# Patient Record
Sex: FEMALE | Race: WHITE | Employment: FULL TIME | URBAN - METROPOLITAN AREA
[De-identification: names, ages, dates, MRNs, and addresses within clinical notes are randomized per-mention and may not be internally consistent; named-entity substitution may affect disease eponyms.]

---

## 2017-07-31 ENCOUNTER — OFFICE VISIT (OUTPATIENT)
Dept: INTERNAL MEDICINE CLINIC | Facility: CLINIC | Age: 57
End: 2017-07-31

## 2017-07-31 VITALS
HEIGHT: 67 IN | BODY MASS INDEX: 38.61 KG/M2 | WEIGHT: 246 LBS | HEART RATE: 70 BPM | TEMPERATURE: 98 F | SYSTOLIC BLOOD PRESSURE: 135 MMHG | DIASTOLIC BLOOD PRESSURE: 85 MMHG

## 2017-07-31 DIAGNOSIS — E66.9 OBESITY (BMI 35.0-39.9 WITHOUT COMORBIDITY): ICD-10-CM

## 2017-07-31 DIAGNOSIS — Z12.39 SCREENING FOR BREAST CANCER: ICD-10-CM

## 2017-07-31 DIAGNOSIS — I10 HTN (HYPERTENSION), BENIGN: ICD-10-CM

## 2017-07-31 DIAGNOSIS — Z00.00 ENCOUNTER FOR MEDICAL EXAMINATION TO ESTABLISH CARE: Primary | ICD-10-CM

## 2017-07-31 DIAGNOSIS — Z12.11 SCREENING FOR COLON CANCER: ICD-10-CM

## 2017-07-31 DIAGNOSIS — D22.39 MELANOCYTIC NEVUS OF FACE, OTHER LOCATION: ICD-10-CM

## 2017-07-31 PROBLEM — D22.9 BENIGN MOLE: Status: ACTIVE | Noted: 2017-07-31

## 2017-07-31 LAB
ALBUMIN SERPL BCP-MCNC: 4.2 G/DL (ref 3.5–4.8)
ALBUMIN/GLOB SERPL: 1.2 {RATIO} (ref 1–2)
ALP SERPL-CCNC: 89 U/L (ref 32–100)
ALT SERPL-CCNC: 33 U/L (ref 14–54)
ANION GAP SERPL CALC-SCNC: 9 MMOL/L (ref 0–18)
AST SERPL-CCNC: 25 U/L (ref 15–41)
BILIRUB SERPL-MCNC: 0.8 MG/DL (ref 0.3–1.2)
BUN SERPL-MCNC: 16 MG/DL (ref 8–20)
BUN/CREAT SERPL: 15.8 (ref 10–20)
CALCIUM SERPL-MCNC: 9.4 MG/DL (ref 8.5–10.5)
CHLORIDE SERPL-SCNC: 103 MMOL/L (ref 95–110)
CHOLEST SERPL-MCNC: 263 MG/DL (ref 110–200)
CO2 SERPL-SCNC: 26 MMOL/L (ref 22–32)
CREAT SERPL-MCNC: 1.01 MG/DL (ref 0.5–1.5)
GLOBULIN PLAS-MCNC: 3.5 G/DL (ref 2.5–3.7)
GLUCOSE SERPL-MCNC: 96 MG/DL (ref 70–99)
HDLC SERPL-MCNC: 46 MG/DL
LDLC SERPL CALC-MCNC: 176 MG/DL (ref 0–99)
NONHDLC SERPL-MCNC: 217 MG/DL
OSMOLALITY UR CALC.SUM OF ELEC: 287 MOSM/KG (ref 275–295)
POTASSIUM SERPL-SCNC: 3.4 MMOL/L (ref 3.3–5.1)
PROT SERPL-MCNC: 7.7 G/DL (ref 5.9–8.4)
SODIUM SERPL-SCNC: 138 MMOL/L (ref 136–144)
TRIGL SERPL-MCNC: 206 MG/DL (ref 1–149)
TSH SERPL-ACNC: 2.43 UIU/ML (ref 0.45–5.33)

## 2017-07-31 PROCEDURE — 36415 COLL VENOUS BLD VENIPUNCTURE: CPT | Performed by: INTERNAL MEDICINE

## 2017-07-31 PROCEDURE — 99204 OFFICE O/P NEW MOD 45 MIN: CPT | Performed by: INTERNAL MEDICINE

## 2017-07-31 PROCEDURE — 99212 OFFICE O/P EST SF 10 MIN: CPT | Performed by: INTERNAL MEDICINE

## 2017-07-31 RX ORDER — LISINOPRIL AND HYDROCHLOROTHIAZIDE 25; 20 MG/1; MG/1
1 TABLET ORAL DAILY
Qty: 90 TABLET | Refills: 2 | Status: SHIPPED | OUTPATIENT
Start: 2017-07-31 | End: 2018-05-08

## 2017-07-31 RX ORDER — METOPROLOL TARTRATE 50 MG/1
50 TABLET, FILM COATED ORAL 2 TIMES DAILY
Qty: 180 TABLET | Refills: 0 | Status: SHIPPED | OUTPATIENT
Start: 2017-07-31 | End: 2017-07-31

## 2017-07-31 RX ORDER — METOPROLOL TARTRATE 50 MG/1
50 TABLET, FILM COATED ORAL 2 TIMES DAILY
Qty: 180 TABLET | Refills: 0 | Status: SHIPPED | OUTPATIENT
Start: 2017-07-31 | End: 2019-11-27

## 2017-07-31 RX ORDER — LISINOPRIL AND HYDROCHLOROTHIAZIDE 25; 20 MG/1; MG/1
1 TABLET ORAL DAILY
Qty: 30 TABLET | Refills: 2 | Status: SHIPPED | OUTPATIENT
Start: 2017-07-31 | End: 2020-10-28

## 2017-07-31 NOTE — PATIENT INSTRUCTIONS
Encounter for medical examination to establish care  (primary encounter diagnosis)  Htn (hypertension), benign- advised to watch her diet and aerobic exercise 4 times a week for 39  Min, check bp at home and bring log book next visit, continue with current · Read labels and choose foods with less added sugar. Keep in mind that dairy foods and foods with fruit will have some natural sugar. · Cut the sugar in recipes by 1/3 to 1/2. Boost the flavor with extracts like almond, vanilla, or orange.  Or add spices If you have high blood pressure, you should do what is listed below to lower your blood pressure. If you are taking medicines for high blood pressure, these methods may reduce or end your need for medicines in the future.   · Begin a weight-loss program if You will need to make regular visits to your health care provider. This is to check your blood pressure and to make changes to your medicines. Make a follow-up appointment as directed.   When to seek medical advice  Call your health care provider right away

## 2017-07-31 NOTE — PROGRESS NOTES
HPI:    Patient ID: Clark Martienz is a 56year old female. HPI She came in today to establish care with new physician.    She states that she does have htn - she doesn't check her bp , she is trying to watch her diet and taking her medication regularly daily. Disp: 30 tablet Rfl: 2   Lisinopril-Hydrochlorothiazide 20-25 MG Oral Tab Take 1 tablet by mouth daily. Disp: 90 tablet Rfl: 2   Metoprolol Tartrate 50 MG Oral Tab Take 1 tablet (50 mg total) by mouth 2 (two) times daily.  Disp: 180 tablet Rfl: 0 rhythm, normal heart sounds and intact distal pulses. Exam reveals no gallop and no friction rub. No murmur heard. Pulmonary/Chest: Effort normal and breath sounds normal. No accessory muscle usage. No respiratory distress. She has no wheezes.  She has Metoprolol Tartrate 50 MG Oral Tab 180 tablet 0      Sig: Take 1 tablet (50 mg total) by mouth 2 (two) times daily.            Imaging & Referrals:  GASTRO - INTERNAL  DERM - INTERNAL  MADHAVI SCREENING WILMAR (CPT=[id number])        #[id number]

## 2017-08-01 LAB — HBA1C MFR BLD: 5.8 % (ref 4–6)

## 2017-11-06 RX ORDER — METOPROLOL TARTRATE 50 MG/1
50 TABLET, FILM COATED ORAL 2 TIMES DAILY
Qty: 180 TABLET | Refills: 0 | Status: SHIPPED | OUTPATIENT
Start: 2017-11-06 | End: 2020-10-28

## 2017-11-07 RX ORDER — METOPROLOL TARTRATE 50 MG/1
50 TABLET, FILM COATED ORAL 2 TIMES DAILY
Qty: 180 TABLET | Refills: 0 | Status: SHIPPED | OUTPATIENT
Start: 2017-11-07 | End: 2017-11-09

## 2017-11-09 RX ORDER — METOPROLOL TARTRATE 50 MG/1
50 TABLET, FILM COATED ORAL 2 TIMES DAILY
Qty: 180 TABLET | Refills: 0 | Status: SHIPPED | OUTPATIENT
Start: 2017-11-09 | End: 2018-11-08

## 2018-05-08 RX ORDER — LISINOPRIL AND HYDROCHLOROTHIAZIDE 25; 20 MG/1; MG/1
1 TABLET ORAL DAILY
Qty: 90 TABLET | Refills: 2 | Status: SHIPPED | OUTPATIENT
Start: 2018-05-08 | End: 2019-02-08

## 2018-11-08 RX ORDER — METOPROLOL TARTRATE 50 MG/1
TABLET, FILM COATED ORAL
Qty: 180 TABLET | Refills: 1 | Status: SHIPPED | OUTPATIENT
Start: 2018-11-08 | End: 2019-05-10

## 2019-02-08 RX ORDER — LISINOPRIL AND HYDROCHLOROTHIAZIDE 25; 20 MG/1; MG/1
1 TABLET ORAL DAILY
Qty: 90 TABLET | Refills: 2 | Status: SHIPPED | OUTPATIENT
Start: 2019-02-08 | End: 2019-11-18

## 2019-05-10 RX ORDER — METOPROLOL TARTRATE 50 MG/1
TABLET, FILM COATED ORAL
Qty: 180 TABLET | Refills: 1 | Status: SHIPPED | OUTPATIENT
Start: 2019-05-10 | End: 2020-02-20

## 2019-05-10 NOTE — TELEPHONE ENCOUNTER
Hypertensive Medications  Protocol Criteria:  · Appointment scheduled in the past 6 months or in the next 3 months  · BMP or CMP in the past 12 months  · Creatinine result < 2  Recent Outpatient Visits            1 year ago Encounter for medical examinatio

## 2019-11-16 NOTE — TELEPHONE ENCOUNTER
My chart message sent to schedule appt     CSS please assist patient in scheduling a follow up appointment - thank you         Please review; protocol failed.     Requested Prescriptions     Pending Prescriptions Disp Refills   • LISINOPRIL-HYDROCHLOROTHIAZ

## 2019-11-18 RX ORDER — LISINOPRIL AND HYDROCHLOROTHIAZIDE 25; 20 MG/1; MG/1
1 TABLET ORAL DAILY
Qty: 90 TABLET | Refills: 0 | OUTPATIENT
Start: 2019-11-18

## 2019-11-27 ENCOUNTER — OFFICE VISIT (OUTPATIENT)
Dept: INTERNAL MEDICINE CLINIC | Facility: CLINIC | Age: 59
End: 2019-11-27
Payer: COMMERCIAL

## 2019-11-27 VITALS
TEMPERATURE: 98 F | HEART RATE: 78 BPM | HEIGHT: 67 IN | BODY MASS INDEX: 39.24 KG/M2 | DIASTOLIC BLOOD PRESSURE: 85 MMHG | RESPIRATION RATE: 18 BRPM | SYSTOLIC BLOOD PRESSURE: 138 MMHG | WEIGHT: 250 LBS

## 2019-11-27 DIAGNOSIS — N93.9 VAGINAL BLEEDING: ICD-10-CM

## 2019-11-27 DIAGNOSIS — Z12.4 SCREENING FOR CERVICAL CANCER: ICD-10-CM

## 2019-11-27 DIAGNOSIS — Z00.00 ANNUAL PHYSICAL EXAM: Primary | ICD-10-CM

## 2019-11-27 DIAGNOSIS — Z12.11 SCREENING FOR COLON CANCER: ICD-10-CM

## 2019-11-27 DIAGNOSIS — Z12.39 SCREENING FOR BREAST CANCER: ICD-10-CM

## 2019-11-27 PROCEDURE — 99396 PREV VISIT EST AGE 40-64: CPT | Performed by: INTERNAL MEDICINE

## 2019-11-27 RX ORDER — LISINOPRIL AND HYDROCHLOROTHIAZIDE 25; 20 MG/1; MG/1
1 TABLET ORAL DAILY
Qty: 90 TABLET | Refills: 2 | Status: SHIPPED | OUTPATIENT
Start: 2019-11-27 | End: 2020-08-26

## 2019-11-27 RX ORDER — METOPROLOL TARTRATE 50 MG/1
50 TABLET, FILM COATED ORAL 2 TIMES DAILY
Qty: 180 TABLET | Refills: 0 | Status: SHIPPED | OUTPATIENT
Start: 2019-11-27 | End: 2020-02-18

## 2019-11-27 NOTE — PROGRESS NOTES
HPI:   Kel Wellington is a 64year old female who presents for a complete physical exam  She is menopausal.  But she states that the last week and since then she noticed some vaginal bleeding. It stopped.         mWt Readings from Last 3 Encounters:  11/27/ irregular heartbeat/palpitations. GI Negative Abdominal pain, blood in stool, constipation, diarrhea, heartburn, nausea and vomiting.  Negative Dysuria, hematuria, urinary incontinence. Menses regular, not heavy.    Endocrine Negative Cold intolerance Normal. No swelling or deformities. Skin Normal Inspection - Normal.   Neurological Normal Memory - Normal. Sensory - Normal. Motor - Normal. Balance & gait - Normal.   Psychiatric Normal Orientation - Oriented to time, place, person & situation.  Appropr

## 2019-11-27 NOTE — PATIENT INSTRUCTIONS
Health maintenance: patient is due for mammogram ordered  Screening for colon cancer referral for colonoscopy ,fit test  htn -controlled advised to continue with current medication check blood pressure at home and bring logbook next visit watch diet avoid

## 2020-02-18 RX ORDER — METOPROLOL TARTRATE 50 MG/1
50 TABLET, FILM COATED ORAL 2 TIMES DAILY
Qty: 180 TABLET | Refills: 0 | Status: SHIPPED | OUTPATIENT
Start: 2020-02-18 | End: 2020-10-28

## 2020-02-20 RX ORDER — METOPROLOL TARTRATE 50 MG/1
TABLET, FILM COATED ORAL
Qty: 180 TABLET | Refills: 1 | Status: SHIPPED | OUTPATIENT
Start: 2020-02-20 | End: 2020-08-21

## 2020-03-02 ENCOUNTER — HOSPITAL ENCOUNTER (OUTPATIENT)
Dept: ULTRASOUND IMAGING | Age: 60
Discharge: HOME | End: 2020-03-02
Attending: INTERNAL MEDICINE
Payer: COMMERCIAL

## 2020-03-02 ENCOUNTER — LAB ENCOUNTER (OUTPATIENT)
Dept: LAB | Age: 60
End: 2020-03-02
Attending: INTERNAL MEDICINE
Payer: COMMERCIAL

## 2020-03-02 DIAGNOSIS — Z00.00 ANNUAL PHYSICAL EXAM: ICD-10-CM

## 2020-03-02 DIAGNOSIS — N93.9 VAGINAL BLEEDING: ICD-10-CM

## 2020-03-02 LAB
ALBUMIN SERPL-MCNC: 3.7 G/DL (ref 3.4–5)
ALBUMIN/GLOB SERPL: 0.9 {RATIO} (ref 1–2)
ALP LIVER SERPL-CCNC: 98 U/L (ref 46–118)
ALT SERPL-CCNC: 35 U/L (ref 13–56)
ANION GAP SERPL CALC-SCNC: 3 MMOL/L (ref 0–18)
AST SERPL-CCNC: 24 U/L (ref 15–37)
BASOPHILS # BLD AUTO: 0.03 X10(3) UL (ref 0–0.2)
BASOPHILS NFR BLD AUTO: 0.5 %
BILIRUB SERPL-MCNC: 0.8 MG/DL (ref 0.1–2)
BUN BLD-MCNC: 19 MG/DL (ref 7–18)
BUN/CREAT SERPL: 19.6 (ref 10–20)
CALCIUM BLD-MCNC: 9.4 MG/DL (ref 8.5–10.1)
CHLORIDE SERPL-SCNC: 107 MMOL/L (ref 98–112)
CHOLEST SMN-MCNC: 238 MG/DL (ref ?–200)
CO2 SERPL-SCNC: 30 MMOL/L (ref 21–32)
CREAT BLD-MCNC: 0.97 MG/DL (ref 0.55–1.02)
DEPRECATED RDW RBC AUTO: 40.7 FL (ref 35.1–46.3)
EOSINOPHIL # BLD AUTO: 0.1 X10(3) UL (ref 0–0.7)
EOSINOPHIL NFR BLD AUTO: 1.6 %
ERYTHROCYTE [DISTWIDTH] IN BLOOD BY AUTOMATED COUNT: 12.6 % (ref 11–15)
EST. AVERAGE GLUCOSE BLD GHB EST-MCNC: 128 MG/DL (ref 68–126)
GLOBULIN PLAS-MCNC: 3.9 G/DL (ref 2.8–4.4)
GLUCOSE BLD-MCNC: 100 MG/DL (ref 70–99)
HBA1C MFR BLD HPLC: 6.1 % (ref ?–5.7)
HCT VFR BLD AUTO: 46.3 % (ref 35–48)
HDLC SERPL-MCNC: 47 MG/DL (ref 40–59)
HGB BLD-MCNC: 15.5 G/DL (ref 12–16)
IMM GRANULOCYTES # BLD AUTO: 0.01 X10(3) UL (ref 0–1)
IMM GRANULOCYTES NFR BLD: 0.2 %
LDLC SERPL CALC-MCNC: 162 MG/DL (ref ?–100)
LYMPHOCYTES # BLD AUTO: 1.88 X10(3) UL (ref 1–4)
LYMPHOCYTES NFR BLD AUTO: 30.1 %
M PROTEIN MFR SERPL ELPH: 7.6 G/DL (ref 6.4–8.2)
MCH RBC QN AUTO: 29.6 PG (ref 26–34)
MCHC RBC AUTO-ENTMCNC: 33.5 G/DL (ref 31–37)
MCV RBC AUTO: 88.5 FL (ref 80–100)
MONOCYTES # BLD AUTO: 0.41 X10(3) UL (ref 0.1–1)
MONOCYTES NFR BLD AUTO: 6.6 %
NEUTROPHILS # BLD AUTO: 3.81 X10 (3) UL (ref 1.5–7.7)
NEUTROPHILS # BLD AUTO: 3.81 X10(3) UL (ref 1.5–7.7)
NEUTROPHILS NFR BLD AUTO: 61 %
NONHDLC SERPL-MCNC: 191 MG/DL (ref ?–130)
OSMOLALITY SERPL CALC.SUM OF ELEC: 292 MOSM/KG (ref 275–295)
PATIENT FASTING Y/N/NP: YES
PATIENT FASTING Y/N/NP: YES
PLATELET # BLD AUTO: 305 10(3)UL (ref 150–450)
POTASSIUM SERPL-SCNC: 4.2 MMOL/L (ref 3.5–5.1)
RBC # BLD AUTO: 5.23 X10(6)UL (ref 3.8–5.3)
SODIUM SERPL-SCNC: 140 MMOL/L (ref 136–145)
T4 FREE SERPL-MCNC: 0.9 NG/DL (ref 0.8–1.7)
TRIGL SERPL-MCNC: 143 MG/DL (ref 30–149)
TSI SER-ACNC: 2.42 MIU/ML (ref 0.36–3.74)
VLDLC SERPL CALC-MCNC: 29 MG/DL (ref 0–30)
WBC # BLD AUTO: 6.2 X10(3) UL (ref 4–11)

## 2020-03-02 PROCEDURE — 83036 HEMOGLOBIN GLYCOSYLATED A1C: CPT

## 2020-03-02 PROCEDURE — 76830 TRANSVAGINAL US NON-OB: CPT | Performed by: INTERNAL MEDICINE

## 2020-03-02 PROCEDURE — 36415 COLL VENOUS BLD VENIPUNCTURE: CPT

## 2020-03-02 PROCEDURE — 84439 ASSAY OF FREE THYROXINE: CPT

## 2020-03-02 PROCEDURE — 84443 ASSAY THYROID STIM HORMONE: CPT

## 2020-03-02 PROCEDURE — 80053 COMPREHEN METABOLIC PANEL: CPT

## 2020-03-02 PROCEDURE — 80061 LIPID PANEL: CPT

## 2020-03-02 PROCEDURE — 76856 US EXAM PELVIC COMPLETE: CPT | Performed by: INTERNAL MEDICINE

## 2020-03-02 PROCEDURE — 85025 COMPLETE CBC W/AUTO DIFF WBC: CPT

## 2020-03-07 ENCOUNTER — TELEPHONE (OUTPATIENT)
Dept: INTERNAL MEDICINE CLINIC | Facility: CLINIC | Age: 60
End: 2020-03-07

## 2020-03-07 ENCOUNTER — TELEPHONE (OUTPATIENT)
Dept: SURGERY | Facility: CLINIC | Age: 60
End: 2020-03-07

## 2020-03-07 RX ORDER — ATORVASTATIN CALCIUM 10 MG/1
10 TABLET, FILM COATED ORAL NIGHTLY
Qty: 90 TABLET | Refills: 0 | Status: SHIPPED | OUTPATIENT
Start: 2020-03-07 | End: 2020-06-05

## 2020-03-07 NOTE — TELEPHONE ENCOUNTER
Per result encounter pt to start atorvastatin. Pt stated she need medication she to [facility] of [facility]. Script resent to requested pharmacy.

## 2020-03-07 NOTE — TELEPHONE ENCOUNTER
----- Message from Andrea Thacker MD sent at 3/2/2020  4:51 PM CST -----  Please let her know that her pelvic us shows that her endometrium is thickened I will refer to see gynecology because we will need to have further work-up.

## 2020-03-12 ENCOUNTER — HOSPITAL ENCOUNTER (OUTPATIENT)
Dept: MAMMOGRAPHY | Age: 60
Discharge: HOME | End: 2020-03-12
Attending: INTERNAL MEDICINE
Payer: COMMERCIAL

## 2020-03-12 DIAGNOSIS — Z12.39 SCREENING FOR BREAST CANCER: ICD-10-CM

## 2020-03-12 PROCEDURE — 77067 SCR MAMMO BI INCL CAD: CPT | Performed by: INTERNAL MEDICINE

## 2020-03-12 PROCEDURE — 77063 BREAST TOMOSYNTHESIS BI: CPT | Performed by: INTERNAL MEDICINE

## 2020-06-05 RX ORDER — ATORVASTATIN CALCIUM 10 MG/1
TABLET, FILM COATED ORAL
Qty: 90 TABLET | Refills: 0 | Status: SHIPPED | OUTPATIENT
Start: 2020-06-05 | End: 2020-09-16

## 2020-08-21 RX ORDER — METOPROLOL TARTRATE 50 MG/1
TABLET, FILM COATED ORAL
Qty: 180 TABLET | Refills: 1 | Status: SHIPPED | OUTPATIENT
Start: 2020-08-21 | End: 2021-02-25

## 2020-08-26 RX ORDER — LISINOPRIL AND HYDROCHLOROTHIAZIDE 25; 20 MG/1; MG/1
TABLET ORAL
Qty: 90 TABLET | Refills: 2 | Status: SHIPPED | OUTPATIENT
Start: 2020-08-26 | End: 2021-05-24

## 2020-09-16 RX ORDER — ATORVASTATIN CALCIUM 10 MG/1
10 TABLET, FILM COATED ORAL NIGHTLY
Qty: 90 TABLET | Refills: 0 | Status: SHIPPED | OUTPATIENT
Start: 2020-09-16 | End: 2020-12-19

## 2020-10-28 ENCOUNTER — OFFICE VISIT (OUTPATIENT)
Dept: INTERNAL MEDICINE CLINIC | Facility: CLINIC | Age: 60
End: 2020-10-28
Payer: COMMERCIAL

## 2020-10-28 VITALS
SYSTOLIC BLOOD PRESSURE: 134 MMHG | TEMPERATURE: 98 F | WEIGHT: 260.19 LBS | HEIGHT: 67 IN | BODY MASS INDEX: 40.84 KG/M2 | HEART RATE: 90 BPM | DIASTOLIC BLOOD PRESSURE: 83 MMHG

## 2020-10-28 DIAGNOSIS — Z23 NEED FOR VACCINATION: ICD-10-CM

## 2020-10-28 DIAGNOSIS — N95.0 POST-MENOPAUSAL BLEEDING: ICD-10-CM

## 2020-10-28 DIAGNOSIS — K62.5 BRBPR (BRIGHT RED BLOOD PER RECTUM): ICD-10-CM

## 2020-10-28 DIAGNOSIS — N85.00 ENDOMETRIAL HYPERPLASIA: Primary | ICD-10-CM

## 2020-10-28 PROCEDURE — 99214 OFFICE O/P EST MOD 30 MIN: CPT | Performed by: INTERNAL MEDICINE

## 2020-10-28 PROCEDURE — 3075F SYST BP GE 130 - 139MM HG: CPT | Performed by: INTERNAL MEDICINE

## 2020-10-28 PROCEDURE — 3079F DIAST BP 80-89 MM HG: CPT | Performed by: INTERNAL MEDICINE

## 2020-10-28 PROCEDURE — 3008F BODY MASS INDEX DOCD: CPT | Performed by: INTERNAL MEDICINE

## 2020-10-28 NOTE — PROGRESS NOTES
Talat Anne is a 64year old female.   Patient presents with:  Bleeding: vaginal bleeding had US 3/2/2020      HPI:   NEW PT   C/C urgent visit   C/o vaginal bleeding  X one yr -once and then reoccurred march and it was a little more and the nit stopped u frequently  MUS: No back pain, + joint pain- knees , no muscle pain  NEURO: denies headaches , anxiety, depression,+ stress     EXAM:   /83   Pulse 90   Temp 98.3 °F (36.8 °C) (Oral)   Ht 5' 7\" (1.702 m)   Wt 260 lb 3.2 oz (118 kg)   BMI 40.75 kg/m² go to the emergency room      Preventative medicine   Mammogram 3/2020 normal   Pap - 11/19 normal         The patient indicates understanding of these issues and agrees to the plan. No follow-ups on file.

## 2020-10-29 ENCOUNTER — LAB ENCOUNTER (OUTPATIENT)
Dept: LAB | Facility: HOSPITAL | Age: 60
End: 2020-10-29
Attending: INTERNAL MEDICINE
Payer: COMMERCIAL

## 2020-10-29 ENCOUNTER — OFFICE VISIT (OUTPATIENT)
Dept: OBGYN CLINIC | Facility: CLINIC | Age: 60
End: 2020-10-29
Payer: COMMERCIAL

## 2020-10-29 VITALS
WEIGHT: 259 LBS | BODY MASS INDEX: 41 KG/M2 | DIASTOLIC BLOOD PRESSURE: 84 MMHG | SYSTOLIC BLOOD PRESSURE: 150 MMHG | HEART RATE: 88 BPM

## 2020-10-29 DIAGNOSIS — N84.1 MUCOUS POLYP OF CERVIX: ICD-10-CM

## 2020-10-29 DIAGNOSIS — N95.0 POST-MENOPAUSAL BLEEDING: ICD-10-CM

## 2020-10-29 DIAGNOSIS — E78.00 HYPERCHOLESTEROLEMIA: ICD-10-CM

## 2020-10-29 DIAGNOSIS — N95.0 POSTMENOPAUSAL BLEEDING: Primary | ICD-10-CM

## 2020-10-29 DIAGNOSIS — R73.01 IFG (IMPAIRED FASTING GLUCOSE): ICD-10-CM

## 2020-10-29 DIAGNOSIS — K62.5 BRBPR (BRIGHT RED BLOOD PER RECTUM): ICD-10-CM

## 2020-10-29 PROCEDURE — 36415 COLL VENOUS BLD VENIPUNCTURE: CPT

## 2020-10-29 PROCEDURE — 3077F SYST BP >= 140 MM HG: CPT | Performed by: OBSTETRICS & GYNECOLOGY

## 2020-10-29 PROCEDURE — 85025 COMPLETE CBC W/AUTO DIFF WBC: CPT

## 2020-10-29 PROCEDURE — 58100 BIOPSY OF UTERUS LINING: CPT | Performed by: OBSTETRICS & GYNECOLOGY

## 2020-10-29 PROCEDURE — 3079F DIAST BP 80-89 MM HG: CPT | Performed by: OBSTETRICS & GYNECOLOGY

## 2020-10-29 PROCEDURE — 80053 COMPREHEN METABOLIC PANEL: CPT

## 2020-10-29 PROCEDURE — 99202 OFFICE O/P NEW SF 15 MIN: CPT | Performed by: OBSTETRICS & GYNECOLOGY

## 2020-10-29 PROCEDURE — 83036 HEMOGLOBIN GLYCOSYLATED A1C: CPT

## 2020-10-29 PROCEDURE — 80061 LIPID PANEL: CPT

## 2020-10-29 NOTE — PROCEDURES
Endometrial Biopsy    Pre-Procedure Care:   Consent was obtained. Procedure/risks were explained. Questions were answered. Correct patient was identified. Correct side and site were confirmed.     Pregnancy Results: negative from n/a test       Pre-Medi

## 2020-10-29 NOTE — PROCEDURES
Polypectomy     Birth control method(s) used: postmenopausal  Consent signed. Procedure discussed with the patient in detail including indication, risks, benefits, alternatives and complications.     Pelvic Exam Findings:  Cervix: polyp    Procedure:  Spec

## 2020-10-29 NOTE — PROGRESS NOTES
Sandra ROJAS 3/19/1960       Patient presents with:  Gyn Exam: annual....vaginal bleeding  pt has had some episodes of postmenopausal bleeding- she did not have it evaluated due to it went away at the time of covid.  She had vaginal bleeding with a tablet (10 mg total) by mouth nightly., Disp: 90 tablet, Rfl: 0    •  LISINOPRIL-HYDROCHLOROTHIAZIDE 20-25 MG Oral Tab, TAKE 1 TABLET BY MOUTH EVERY DAY, Disp: 90 tablet, Rfl: 2    •  Metoprolol Tartrate 50 MG Oral Tab, TAKE 1 TABLET BY MOUTH TWICE DAILY,

## 2020-11-19 ENCOUNTER — NURSE ONLY (OUTPATIENT)
Dept: GASTROENTEROLOGY | Facility: CLINIC | Age: 60
End: 2020-11-19

## 2020-11-19 NOTE — PROGRESS NOTES
Unable to do phone screening due to hx of blood in stool and irregular bowel habits. Denies symptoms at this time. Appointment made for 12/28/2020 at 10:30 am  with Rupert Medina. Patient advised to come 15 minutes early. Address given.   Patient voiced

## 2020-12-03 ENCOUNTER — TELEPHONE (OUTPATIENT)
Dept: INTERNAL MEDICINE CLINIC | Facility: CLINIC | Age: 60
End: 2020-12-03

## 2020-12-03 NOTE — TELEPHONE ENCOUNTER
Patient states that she tested positive for Covid last week at a government testing site. Still having low grade fever, nausea, diarrhea and weakness. Patient requested to see Dr. Karie Montero. Virtual visit made for tomorrow.    Future Appointments   D

## 2020-12-04 ENCOUNTER — TELEMEDICINE (OUTPATIENT)
Dept: INTERNAL MEDICINE CLINIC | Facility: CLINIC | Age: 60
End: 2020-12-04
Payer: COMMERCIAL

## 2020-12-04 ENCOUNTER — TELEPHONE (OUTPATIENT)
Dept: INTERNAL MEDICINE CLINIC | Facility: CLINIC | Age: 60
End: 2020-12-04

## 2020-12-04 DIAGNOSIS — U07.1 COVID-19: Primary | ICD-10-CM

## 2020-12-04 PROCEDURE — 99213 OFFICE O/P EST LOW 20 MIN: CPT | Performed by: INTERNAL MEDICINE

## 2020-12-04 RX ORDER — ALBUTEROL SULFATE 90 UG/1
2 AEROSOL, METERED RESPIRATORY (INHALATION) EVERY 6 HOURS PRN
Qty: 1 INHALER | Refills: 0 | Status: SHIPPED | OUTPATIENT
Start: 2020-12-04

## 2020-12-04 NOTE — TELEPHONE ENCOUNTER
Patient had a visit earlier today. She had initially refused an Albuterol Inhaler. Patient changed her mind and is asking if you could prescribe it.     Please advise

## 2020-12-04 NOTE — PROGRESS NOTES
Patient ID: Debra Brown is a 64year old female. Patient presents with:  Fever         HISTORY OF PRESENT ILLNESS:   Patient presents for above. This visit is conducted using Telemedicine with live, interactive video and audio.     C/c covid   c/o  Flu      • OTHER SURGICAL HISTORY      neck surg bn cyst in the  like          Current Outpatient Medications:   •  atorvastatin 10 MG Oral Tab, Take 1 tablet (10 mg total) by mouth nightly., Disp: 90 tablet, Rfl: 0  •  LISINOPRIL-HYDROCHLOROTH perform vitals or do physical exam as this is a virtual video visit. Patient appears alert. No conversational dyspnea or distress. ASSESSMENT/PLAN:   1.  COVID-19  Advised to wash hands frequently, cover coughs and sneezes wear a mask even at home soci agreed to telehealth consent form, related consents and the risks discussed. Lastly, the patient confirmed that [place] in [place]. Included in this visit, time may have been spent reviewing labs, medications, radiology tests and decision making.  A

## 2020-12-16 RX ORDER — ATORVASTATIN CALCIUM 10 MG/1
TABLET, FILM COATED ORAL
Qty: 90 TABLET | Refills: 0 | OUTPATIENT
Start: 2020-12-16

## 2020-12-16 NOTE — TELEPHONE ENCOUNTER
Patient is seeing another provider will call them for refill.    I will remove Dr Homero Dyer from patient chart

## 2020-12-19 RX ORDER — ATORVASTATIN CALCIUM 10 MG/1
10 TABLET, FILM COATED ORAL NIGHTLY
Qty: 90 TABLET | Refills: 1 | Status: SHIPPED | OUTPATIENT
Start: 2020-12-19 | End: 2021-06-03

## 2020-12-19 NOTE — TELEPHONE ENCOUNTER
Patient needs refill on Atorvastatin. It was last prescribed by Camryn Rosario, but she has since changed her PCP to [facility]. Please advise.  Refill pended for approval.

## 2021-02-19 RX ORDER — LANCETS 33 GAUGE
EACH MISCELLANEOUS
Qty: 100 EACH | Refills: 0 | Status: SHIPPED | OUTPATIENT
Start: 2021-02-19

## 2021-02-25 RX ORDER — METOPROLOL TARTRATE 50 MG/1
TABLET, FILM COATED ORAL
Qty: 180 TABLET | Refills: 1 | Status: SHIPPED | OUTPATIENT
Start: 2021-02-25 | End: 2021-08-16

## 2021-02-25 NOTE — TELEPHONE ENCOUNTER
SECOND REQUEST  Current Outpatient Medications:     •  Metoprolol Tartrate 50 MG Oral Tab, TAKE 1 TABLET BY MOUTH TWICE DAILY, Disp: 180 tablet, Rfl: 1

## 2021-03-18 DIAGNOSIS — Z23 NEED FOR VACCINATION: ICD-10-CM

## 2021-05-24 RX ORDER — LISINOPRIL AND HYDROCHLOROTHIAZIDE 25; 20 MG/1; MG/1
1 TABLET ORAL DAILY
Qty: 90 TABLET | Refills: 0 | Status: SHIPPED | OUTPATIENT
Start: 2021-05-24 | End: 2021-08-20

## 2021-06-03 RX ORDER — ATORVASTATIN CALCIUM 10 MG/1
TABLET, FILM COATED ORAL
Qty: 90 TABLET | Refills: 1 | Status: SHIPPED | OUTPATIENT
Start: 2021-06-03 | End: 2021-09-27

## 2021-06-25 ENCOUNTER — TELEPHONE (OUTPATIENT)
Dept: OBGYN CLINIC | Facility: CLINIC | Age: 61
End: 2021-06-25

## 2021-06-25 DIAGNOSIS — N95.0 POSTMENOPAUSAL BLEEDING: Primary | ICD-10-CM

## 2021-06-25 NOTE — TELEPHONE ENCOUNTER
Per pt is having some worsening concerns, would like to discuss with nurse no other details.  Please advise

## 2021-06-25 NOTE — TELEPHONE ENCOUNTER
Pt states she was seen in 10/2020 for PMB. She had a cervical polyp removed and a normal embx. Pt states since then she has had tiny spotting here and there. This morning she had some bright red bleeding, like a light flow.   This is more than what she h

## 2021-06-28 NOTE — TELEPHONE ENCOUNTER
Please order a pelvic US to look at her endometrial stripe- if thickened then we may need to schedule a D&C.

## 2021-06-28 NOTE — TELEPHONE ENCOUNTER
Pt informed of CAps recs and pelvic US order placed. Pt provided with # to CS to set up appt for US.

## 2021-06-30 ENCOUNTER — HOSPITAL ENCOUNTER (OUTPATIENT)
Dept: ULTRASOUND IMAGING | Facility: HOSPITAL | Age: 61
Discharge: HOME | End: 2021-06-30
Attending: OBSTETRICS & GYNECOLOGY
Payer: MEDICAID

## 2021-06-30 DIAGNOSIS — N95.0 POSTMENOPAUSAL BLEEDING: ICD-10-CM

## 2021-06-30 PROCEDURE — 76830 TRANSVAGINAL US NON-OB: CPT | Performed by: OBSTETRICS & GYNECOLOGY

## 2021-06-30 PROCEDURE — 76856 US EXAM PELVIC COMPLETE: CPT | Performed by: OBSTETRICS & GYNECOLOGY

## 2021-07-14 ENCOUNTER — TELEPHONE (OUTPATIENT)
Dept: OBGYN CLINIC | Facility: CLINIC | Age: 61
End: 2021-07-14

## 2021-07-14 NOTE — TELEPHONE ENCOUNTER
----- Message from Misha Gramajo MD sent at 7/5/2021  9:42 PM CDT -----  There appears to be some thickening at the fundus of her uterus which could possibly be a polyp. This would explain her continued spotting after her negative EMB. Please call pt to come in for a 10 min preop discussion for D&C.

## 2021-07-20 ENCOUNTER — OFFICE VISIT (OUTPATIENT)
Dept: OBGYN CLINIC | Facility: CLINIC | Age: 61
End: 2021-07-20
Payer: MEDICAID

## 2021-07-20 ENCOUNTER — TELEPHONE (OUTPATIENT)
Dept: OBGYN CLINIC | Facility: CLINIC | Age: 61
End: 2021-07-20

## 2021-07-20 VITALS
DIASTOLIC BLOOD PRESSURE: 80 MMHG | HEART RATE: 76 BPM | BODY MASS INDEX: 40 KG/M2 | WEIGHT: 255.19 LBS | SYSTOLIC BLOOD PRESSURE: 116 MMHG

## 2021-07-20 DIAGNOSIS — R93.89 THICKENED ENDOMETRIUM: Primary | ICD-10-CM

## 2021-07-20 PROCEDURE — 3074F SYST BP LT 130 MM HG: CPT | Performed by: OBSTETRICS & GYNECOLOGY

## 2021-07-20 PROCEDURE — 99212 OFFICE O/P EST SF 10 MIN: CPT | Performed by: OBSTETRICS & GYNECOLOGY

## 2021-07-20 PROCEDURE — 3079F DIAST BP 80-89 MM HG: CPT | Performed by: OBSTETRICS & GYNECOLOGY

## 2021-07-20 RX ORDER — CALCIUM CITRATE/VITAMIN D3 200MG-6.25
1 TABLET ORAL DAILY
COMMUNITY
Start: 2021-02-19

## 2021-07-20 NOTE — PROGRESS NOTES
Sandra Musa    3/19/1960       Patient presents with:  Consult: pre-op consult for d&c    pt is s/p negative EMB but persistent PMB. US revealed that there was thickening of the lining at the fundus. I recommended hysteroscopy/D&C.   We discussed the

## 2021-07-20 NOTE — TELEPHONE ENCOUNTER
OB GYN SURGICAL SCHEDULING    Assessment: postmenopausal bleeding, thickened endometrium    Pre-Operative Procedure:  Hysteroscopy - surgical, possible myosure polypectomy    Admission:  Day Surgery    Anesthesia: General    Additional Orders:  Routine Toshia Bee

## 2021-07-23 NOTE — TELEPHONE ENCOUNTER
Telma Avendaño for afternoon on call after office hours.   Please check the in house schedule to make sure I am not in house when it is scheduled

## 2021-08-16 RX ORDER — METOPROLOL TARTRATE 50 MG/1
TABLET, FILM COATED ORAL
Qty: 180 TABLET | Refills: 1 | Status: SHIPPED | OUTPATIENT
Start: 2021-08-16 | End: 2021-11-20

## 2021-08-20 RX ORDER — LISINOPRIL AND HYDROCHLOROTHIAZIDE 25; 20 MG/1; MG/1
1 TABLET ORAL DAILY
Qty: 90 TABLET | Refills: 0 | Status: SHIPPED | OUTPATIENT
Start: 2021-08-20 | End: 2021-11-20

## 2021-09-27 RX ORDER — ATORVASTATIN CALCIUM 10 MG/1
10 TABLET, FILM COATED ORAL NIGHTLY
Qty: 90 TABLET | Refills: 1 | Status: SHIPPED | OUTPATIENT
Start: 2021-09-27 | End: 2021-11-20

## 2021-09-27 NOTE — TELEPHONE ENCOUNTER
Refill passed per [address] protocol.     Requested Prescriptions   Pending Prescriptions Disp Refills    ATORVASTATIN 10 MG Oral Tab [Pharmacy Med Name: ATORVASTATIN 10MG TABLETS] 90 tablet 1     Sig: TAKE 1 TABLET(10 MG) BY MOUTH EVERY NIGHT        Cholesterol Medication Protocol Passed - 9/27/2021  8:09 AM        Passed - ALT in past 12 months        Passed - LDL in past 12 months        Passed - Last ALT < 80       Lab Results   Component Value Date    ALT 38 10/29/2020             Passed - Last LDL < 130     Lab Results   Component Value Date     (H) 10/29/2020               Passed - Appointment in past 12 or next 3 months                  Recent Outpatient Visits              2 months ago Thickened endometrium    [facility], [address], [place] Gustave Runner, MD    Office Visit    9 months ago COVID-19    [address], [facility], Chito Cheatham MD    Telemedicine    10 months ago     [address] GI PROCEDURE    Nurse Only    11 months ago Postmenopausal bleeding    [facility], [address], [place] Gustave Runner, MD    Office Visit    11 months ago Endometrial hyperplasia    [address], Chito Cheatham MD    Office Visit

## 2021-11-20 NOTE — TELEPHONE ENCOUNTER
Please review; protocol failed/No Protcol    Requested Prescriptions   Pending Prescriptions Disp Refills    metoprolol tartrate 50 MG Oral Tab 180 tablet 1     Sig: Take 1 tablet (50 mg total) by mouth 2 (two) times daily.         Hypertensive Medications Gaby Bowen MD    Office Visit

## 2021-11-21 RX ORDER — LISINOPRIL AND HYDROCHLOROTHIAZIDE 25; 20 MG/1; MG/1
1 TABLET ORAL DAILY
Qty: 90 TABLET | Refills: 0 | Status: SHIPPED | OUTPATIENT
Start: 2021-11-21

## 2021-11-21 RX ORDER — METOPROLOL TARTRATE 50 MG/1
50 TABLET, FILM COATED ORAL 2 TIMES DAILY
Qty: 180 TABLET | Refills: 1 | Status: SHIPPED | OUTPATIENT
Start: 2021-11-21

## 2021-11-21 RX ORDER — ATORVASTATIN CALCIUM 10 MG/1
10 TABLET, FILM COATED ORAL NIGHTLY
Qty: 90 TABLET | Refills: 1 | Status: SHIPPED | OUTPATIENT
Start: 2021-11-21

## 2022-01-27 ENCOUNTER — NURSE TRIAGE (OUTPATIENT)
Dept: INTERNAL MEDICINE CLINIC | Facility: CLINIC | Age: 62
End: 2022-01-27

## 2022-01-27 NOTE — TELEPHONE ENCOUNTER
Action Requested: Summary for Provider     []  Critical Lab, Recommendations Needed  [] Need Additional Advice  []   FYI    []   Need Orders  [] Need Medications Sent to Pharmacy  []  Other     SUMMARY:appt made, only want Dr Lira Seen leg pain, no sw

## 2022-02-15 ENCOUNTER — LAB ENCOUNTER (OUTPATIENT)
Dept: LAB | Age: 62
End: 2022-02-15
Attending: INTERNAL MEDICINE
Payer: MEDICAID

## 2022-02-15 ENCOUNTER — OFFICE VISIT (OUTPATIENT)
Dept: INTERNAL MEDICINE CLINIC | Facility: CLINIC | Age: 62
End: 2022-02-15
Payer: MEDICAID

## 2022-02-15 VITALS
SYSTOLIC BLOOD PRESSURE: 125 MMHG | WEIGHT: 260 LBS | RESPIRATION RATE: 18 BRPM | HEIGHT: 67 IN | BODY MASS INDEX: 40.81 KG/M2 | HEART RATE: 79 BPM | DIASTOLIC BLOOD PRESSURE: 80 MMHG

## 2022-02-15 DIAGNOSIS — G89.29 CHRONIC PAIN OF LEFT KNEE: ICD-10-CM

## 2022-02-15 DIAGNOSIS — Z12.31 SCREENING MAMMOGRAM, ENCOUNTER FOR: ICD-10-CM

## 2022-02-15 DIAGNOSIS — I10 HTN (HYPERTENSION), BENIGN: ICD-10-CM

## 2022-02-15 DIAGNOSIS — Z82.49 FAMILY HISTORY OF HEART DISEASE: ICD-10-CM

## 2022-02-15 DIAGNOSIS — Z12.11 COLON CANCER SCREENING: ICD-10-CM

## 2022-02-15 DIAGNOSIS — I10 HTN (HYPERTENSION), BENIGN: Primary | ICD-10-CM

## 2022-02-15 DIAGNOSIS — M25.562 CHRONIC PAIN OF LEFT KNEE: ICD-10-CM

## 2022-02-15 DIAGNOSIS — E11.9 TYPE 2 DIABETES MELLITUS WITHOUT COMPLICATION, WITHOUT LONG-TERM CURRENT USE OF INSULIN (HCC): ICD-10-CM

## 2022-02-15 PROBLEM — E66.01 MORBID (SEVERE) OBESITY DUE TO EXCESS CALORIES (HCC): Status: ACTIVE | Noted: 2022-02-15

## 2022-02-15 PROCEDURE — 93010 ELECTROCARDIOGRAM REPORT: CPT | Performed by: INTERNAL MEDICINE

## 2022-02-15 PROCEDURE — 3074F SYST BP LT 130 MM HG: CPT | Performed by: INTERNAL MEDICINE

## 2022-02-15 PROCEDURE — 99214 OFFICE O/P EST MOD 30 MIN: CPT | Performed by: INTERNAL MEDICINE

## 2022-02-15 PROCEDURE — 3008F BODY MASS INDEX DOCD: CPT | Performed by: INTERNAL MEDICINE

## 2022-02-15 PROCEDURE — 93005 ELECTROCARDIOGRAM TRACING: CPT

## 2022-02-15 PROCEDURE — 3079F DIAST BP 80-89 MM HG: CPT | Performed by: INTERNAL MEDICINE

## 2022-02-15 RX ORDER — METOPROLOL TARTRATE 50 MG/1
50 TABLET, FILM COATED ORAL 2 TIMES DAILY
Qty: 180 TABLET | Refills: 1 | OUTPATIENT
Start: 2022-02-15

## 2022-02-15 RX ORDER — ATORVASTATIN CALCIUM 10 MG/1
10 TABLET, FILM COATED ORAL NIGHTLY
Qty: 90 TABLET | Refills: 1 | OUTPATIENT
Start: 2022-02-15

## 2022-02-15 RX ORDER — LISINOPRIL AND HYDROCHLOROTHIAZIDE 25; 20 MG/1; MG/1
1 TABLET ORAL DAILY
Qty: 90 TABLET | Refills: 0 | Status: CANCELLED | OUTPATIENT
Start: 2022-02-15

## 2022-02-15 RX ORDER — LISINOPRIL AND HYDROCHLOROTHIAZIDE 25; 20 MG/1; MG/1
1 TABLET ORAL DAILY
Qty: 90 TABLET | Refills: 1 | Status: SHIPPED | OUTPATIENT
Start: 2022-02-15

## 2022-02-16 ENCOUNTER — HOSPITAL ENCOUNTER (OUTPATIENT)
Dept: GENERAL RADIOLOGY | Age: 62
Discharge: HOME | End: 2022-02-16
Attending: INTERNAL MEDICINE
Payer: MEDICAID

## 2022-02-16 DIAGNOSIS — G89.29 CHRONIC PAIN OF LEFT KNEE: ICD-10-CM

## 2022-02-16 DIAGNOSIS — M25.562 CHRONIC PAIN OF LEFT KNEE: ICD-10-CM

## 2022-02-16 PROCEDURE — 73562 X-RAY EXAM OF KNEE 3: CPT | Performed by: INTERNAL MEDICINE

## 2022-03-14 RX ORDER — METOPROLOL TARTRATE 50 MG/1
50 TABLET, FILM COATED ORAL 2 TIMES DAILY
Qty: 180 TABLET | Refills: 1 | Status: SHIPPED | OUTPATIENT
Start: 2022-03-14

## 2022-03-15 NOTE — TELEPHONE ENCOUNTER
Please review; protocol failed/no protocol.   Requested Prescriptions   Pending Prescriptions Disp Refills    METOPROLOL TARTRATE 50 MG Oral Tab [Pharmacy Med Name: METOPROLOL TARTRATE 50MG TABLETS] 180 tablet 1     Sig: TAKE 1 TABLET(50 MG) BY MOUTH TWICE DAILY        Hypertensive Medications Protocol Failed - 3/12/2022 11:28 AM        Failed - CMP or BMP in past 12 months        Passed - Appointment in past 6 or next 3 months        Passed - GFR Non- > 50     Lab Results   Component Value Date    GFRNAA 70 10/29/2020                     Recent Outpatient Visits              3 weeks ago HTN (hypertension), benign    Martha Vazquez MD    Office Visit    7 months ago Thickened endometrium    [facility] [place], Wilner Anderson MD    Office Visit    1 year ago COVID-19    [facility], [address] [facility] Giovani Choudhary MD    Telemedicine    1 year ago     [facility] GI PROCEDURE    Nurse Only    1 year ago Postmenopausal bleeding    [facility] [place], Wilner Anderson MD    Office Visit          Future Appointments         Provider Department Appt Notes    In 2 days [facility] [address] in [place] for pre-procedure // date moved

## 2022-03-16 ENCOUNTER — HOSPITAL ENCOUNTER (OUTPATIENT)
Dept: CV DIAGNOSTICS | Age: 62
Discharge: HOME | End: 2022-03-16
Attending: INTERNAL MEDICINE
Payer: MEDICAID

## 2022-03-16 DIAGNOSIS — R07.9 CHEST PAIN, UNSPECIFIED TYPE: ICD-10-CM

## 2022-03-16 DIAGNOSIS — I10 HYPERTENSION, UNSPECIFIED TYPE: ICD-10-CM

## 2022-03-16 DIAGNOSIS — E11.9 CONTROLLED TYPE 2 DIABETES MELLITUS WITHOUT COMPLICATION, WITHOUT LONG-TERM CURRENT USE OF INSULIN (HCC): ICD-10-CM

## 2022-03-16 PROCEDURE — 93306 TTE W/DOPPLER COMPLETE: CPT | Performed by: INTERNAL MEDICINE

## 2022-05-11 RX ORDER — ATORVASTATIN CALCIUM 10 MG/1
TABLET, FILM COATED ORAL
Qty: 90 TABLET | Refills: 1 | OUTPATIENT
Start: 2022-05-11

## 2022-05-11 NOTE — TELEPHONE ENCOUNTER
Left message to call back. Transfer to triage.      Patient has pending lab work that needs to be done at [facility].

## 2022-06-10 ENCOUNTER — TELEPHONE (OUTPATIENT)
Dept: INTERNAL MEDICINE CLINIC | Facility: CLINIC | Age: 62
End: 2022-06-10

## 2022-06-10 DIAGNOSIS — I10 HTN (HYPERTENSION), BENIGN: ICD-10-CM

## 2022-06-10 RX ORDER — LISINOPRIL AND HYDROCHLOROTHIAZIDE 25; 20 MG/1; MG/1
1 TABLET ORAL DAILY
Qty: 90 TABLET | Refills: 1 | Status: SHIPPED | OUTPATIENT
Start: 2022-06-10 | End: 2022-06-10

## 2022-06-10 RX ORDER — ATORVASTATIN CALCIUM 10 MG/1
10 TABLET, FILM COATED ORAL NIGHTLY
Qty: 90 TABLET | Refills: 1 | Status: SHIPPED | OUTPATIENT
Start: 2022-06-10 | End: 2022-06-10

## 2022-06-10 RX ORDER — ATORVASTATIN CALCIUM 10 MG/1
10 TABLET, FILM COATED ORAL NIGHTLY
Qty: 90 TABLET | Refills: 1 | Status: SHIPPED | OUTPATIENT
Start: 2022-06-10

## 2022-06-10 RX ORDER — LISINOPRIL AND HYDROCHLOROTHIAZIDE 25; 20 MG/1; MG/1
1 TABLET ORAL DAILY
Qty: 90 TABLET | Refills: 1 | Status: SHIPPED | OUTPATIENT
Start: 2022-06-10

## 2022-06-10 RX ORDER — METOPROLOL TARTRATE 50 MG/1
50 TABLET, FILM COATED ORAL 2 TIMES DAILY
Qty: 180 TABLET | Refills: 1 | Status: SHIPPED | OUTPATIENT
Start: 2022-06-10

## 2022-06-10 RX ORDER — METOPROLOL TARTRATE 50 MG/1
50 TABLET, FILM COATED ORAL 2 TIMES DAILY
Qty: 180 TABLET | Refills: 1 | Status: SHIPPED | OUTPATIENT
Start: 2022-06-10 | End: 2022-06-10

## 2022-06-10 NOTE — TELEPHONE ENCOUNTER
Please review. Protocol failed / No protocol. Patient had labs done 6/10/22 at [facility]. No results available as of time of this note. Requested Prescriptions   Pending Prescriptions Disp Refills    atorvastatin 10 MG Oral Tab 90 tablet 1     Sig: Take 1 tablet (10 mg total) by mouth nightly. Cholesterol Medication Protocol Failed - 6/10/2022 12:41 PM        Failed - ALT in past 12 months        Failed - LDL in past 12 months        Failed - Last ALT < 80       Lab Results   Component Value Date    ALT 38 10/29/2020             Failed - Last LDL < 130     Lab Results   Component Value Date     (H) 10/29/2020               Passed - Appointment in past 12 or next 3 months           lisinopril-hydroCHLOROthiazide 20-25 MG Oral Tab 90 tablet 1     Sig: Take 1 tablet by mouth daily. Hypertensive Medications Protocol Failed - 6/10/2022 12:41 PM        Failed - CMP or BMP in past 12 months        Passed - Appointment in past 6 or next 3 months        Passed - GFR Non- > 50     Lab Results   Component Value Date    GFRNAA 70 10/29/2020                    metoprolol tartrate 50 MG Oral Tab 180 tablet 1     Sig: Take 1 tablet (50 mg total) by mouth 2 (two) times daily.         Hypertensive Medications Protocol Failed - 6/10/2022 12:41 PM        Failed - CMP or BMP in past 12 months        Passed - Appointment in past 6 or next 3 months        Passed - GFR Non- > 50     Lab Results   Component Value Date    GFRNAA 70 10/29/2020                         Recent Outpatient Visits              3 months ago HTN (hypertension), benign    Diane Barbour Loree Ebbs, MD    Office Visit    10 months ago Thickened endometrium    [facility], [address], [place] Jayesh Mcnulty MD    Office Visit    1 year ago COVID-19    Loan Barbour MD    Telemedicine    1 year ago     Farhat Jaramillo Nurse Only    1 year ago Postmenopausal bleeding    [facility], [address] [facility] [address], [place] Danica Pfeiffer MD    Office Visit

## 2022-06-11 LAB
ALBUMIN/GLOBULIN RATIO: 1.5 (CALC) (ref 1–2.5)
ALBUMIN: 3.7 G/DL (ref 3.6–5.1)
ALKALINE PHOSPHATASE: 81 U/L (ref 37–153)
ALT: 27 U/L (ref 6–29)
AST: 18 U/L (ref 10–35)
BILIRUBIN, TOTAL: 1.3 MG/DL (ref 0.2–1.2)
BUN: 15 MG/DL (ref 7–25)
CALCIUM: 9.1 MG/DL (ref 8.6–10.4)
CARBON DIOXIDE: 30 MMOL/L (ref 20–32)
CHLORIDE: 105 MMOL/L (ref 98–110)
CHOL/HDLC RATIO: 3.9 (CALC)
CHOLESTEROL, TOTAL: 172 MG/DL
CREATININE, RANDOM URINE: 175 MG/DL (ref 20–275)
CREATININE: 0.9 MG/DL (ref 0.5–0.99)
EGFR IF AFRICN AM: 81 ML/MIN/1.73M2
EGFR IF NONAFRICN AM: 69 ML/MIN/1.73M2
GLOBULIN: 2.4 G/DL (CALC) (ref 1.9–3.7)
GLUCOSE: 108 MG/DL (ref 65–99)
HDL CHOLESTEROL: 44 MG/DL
HEMATOCRIT: 46.1 % (ref 35–45)
HEMOGLOBIN A1C: 6.2 % OF TOTAL HGB
HEMOGLOBIN: 15.2 G/DL (ref 11.7–15.5)
LDL-CHOLESTEROL: 103 MG/DL (CALC)
MCH: 29.4 PG (ref 27–33)
MCHC: 33 G/DL (ref 32–36)
MCV: 89.2 FL (ref 80–100)
MICROALBUMIN/CREATININE RATIO, RANDOM URINE: 5 MCG/MG CREAT
MICROALBUMIN: 0.8 MG/DL
MPV: 9.8 FL (ref 7.5–12.5)
NON-HDL CHOLESTEROL: 128 MG/DL (CALC)
PLATELET COUNT: 271 THOUSAND/UL (ref 140–400)
POTASSIUM: 4.2 MMOL/L (ref 3.5–5.3)
PROTEIN, TOTAL: 6.1 G/DL (ref 6.1–8.1)
RDW: 12.8 % (ref 11–15)
RED BLOOD CELL COUNT: 5.17 MILLION/UL (ref 3.8–5.1)
SODIUM: 142 MMOL/L (ref 135–146)
TRIGLYCERIDES: 157 MG/DL
TSH: 3.04 MIU/L (ref 0.4–4.5)
WHITE BLOOD CELL COUNT: 6.1 THOUSAND/UL (ref 3.8–10.8)

## 2022-07-20 DIAGNOSIS — I10 HTN (HYPERTENSION), BENIGN: ICD-10-CM

## 2022-07-20 RX ORDER — LISINOPRIL AND HYDROCHLOROTHIAZIDE 25; 20 MG/1; MG/1
1 TABLET ORAL DAILY
Qty: 90 TABLET | Refills: 1 | Status: SHIPPED | OUTPATIENT
Start: 2022-07-20

## 2022-09-12 RX ORDER — METOPROLOL TARTRATE 50 MG/1
TABLET, FILM COATED ORAL
Qty: 180 TABLET | Refills: 1 | Status: SHIPPED | OUTPATIENT
Start: 2022-09-12

## 2022-09-12 NOTE — TELEPHONE ENCOUNTER
Please review; protocol failed/No Protcol    Requested Prescriptions   Pending Prescriptions Disp Refills    METOPROLOL TARTRATE 50 MG Oral Tab [Pharmacy Med Name: METOPROLOL TARTRATE 50MG TABLETS] 180 tablet 1     Sig: TAKE 1 TABLET(50 MG) BY MOUTH TWICE DAILY        Hypertensive Medications Protocol Failed - 9/12/2022  4:15 PM        Failed - In person appointment or virtual visit in the past 6 months       Recent Outpatient Visits              6 months ago HTN (hypertension), benign    Martha Vazquez MD    Office Visit    1 year ago Thickened endometrium    [facility], [address], Wilner Anderson MD    Office Visit    1 year ago COVID-19    Diane Mena Harland Points, MD    Telemedicine    1 year ago     [address] GI PROCEDURE    Nurse Only    1 year ago Postmenopausal bleeding    [facility], [address], Fredi Quinteros MD    Office Visit                 Passed - In person appointment in the past 12 or next 3 months       Recent Outpatient Visits              6 months ago HTN (hypertension), benign    Lewis Mena MD    Office Visit    1 year ago Thickened endometrium    [facility], [address], Wilner Anderson MD    Office Visit    1 year ago COVID-19    [facility] [place], [facility]    Telemedicine    1 year ago     [address] GI PROCEDURE    Nurse Only    1 year ago Postmenopausal bleeding    [facility], [address], Fredi Quinteros MD    Office Visit                 Passed - Last BP reading less than 140/90     BP Readings from Last 1 Encounters:  02/15/22 : 125/80                Passed - CMP or BMP in past 6 months     Recent Results (from the past 4392 hour(s))   COMP METABOLIC PANEL (14)    Collection Time: 06/10/22 11:06 AM   Result Value Ref Range    GLUCOSE 108 (H) 65 - 99 mg/dL     Comment:               Fasting reference interval     For someone without known diabetes, a glucose value  between 100 and 125 mg/dL is consistent with  prediabetes and should be confirmed with a  follow-up test.         UREA NITROGEN (BUN) 15 7 - 25 mg/dL    CREATININE 0.90 0.50 - 0.99 mg/dL     Comment: For patients >52years of age, the reference limit  for Creatinine is approximately 13% higher for people  identified as -American. eGFR NON-AFR. AMERICAN 69 > OR = 60 mL/min/1.73m2    eGFR AFRICAN AMERICAN 81 > OR = 60 mL/min/1.73m2    BUN/CREATININE RATIO NOT APPLICABLE 6 - 22 (calc)    SODIUM 142 135 - 146 mmol/L    POTASSIUM 4.2 3.5 - 5.3 mmol/L    CHLORIDE 105 98 - 110 mmol/L    CARBON DIOXIDE 30 20 - 32 mmol/L    CALCIUM 9.1 8.6 - 10.4 mg/dL    PROTEIN, TOTAL 6.1 6.1 - 8.1 g/dL    ALBUMIN 3.7 3.6 - 5.1 g/dL    GLOBULIN 2.4 1.9 - 3.7 g/dL (calc)    ALBUMIN/GLOBULIN RATIO 1.5 1.0 - 2.5 (calc)    BILIRUBIN, TOTAL 1.3 (H) 0.2 - 1.2 mg/dL    ALKALINE PHOSPHATASE 81 37 - 153 U/L    AST 18 10 - 35 U/L    ALT 27 6 - 29 U/L     *Note: Due to a large number of results and/or encounters for the requested time period, some results have not been displayed. A complete set of results can be found in Results Review.                  Passed - GFR > 50     No results found for: [facility]                    Recent Outpatient Visits              6 months ago HTN (hypertension), benign    Kem Celeste MD    Office Visit    1 year ago Thickened endometrium    [facility] Wilner Stone MD    Office Visit    1 year ago COVID-19    [address], [address] Choudhary MD    Telemedicine    1 year ago     [address] GI PROCEDURE    Nurse Only    1 year ago Postmenopausal bleeding    [facility] for Wilner Stone MD Office Visit

## 2022-10-13 DIAGNOSIS — I10 HTN (HYPERTENSION), BENIGN: ICD-10-CM

## 2022-10-13 RX ORDER — LISINOPRIL AND HYDROCHLOROTHIAZIDE 25; 20 MG/1; MG/1
1 TABLET ORAL DAILY
Qty: 90 TABLET | Refills: 1 | Status: CANCELLED | OUTPATIENT
Start: 2022-10-13

## 2022-10-14 NOTE — TELEPHONE ENCOUNTER
Duplicate request.  Pt should have a refill for October for 90 days  Confirmed with pharmacy and will have medication ready for Pt.  lisinopril-hydroCHLOROthiazide 20-25 MG Oral Tab 90 tablet 1 7/20/2022     Sig - Route:  Take 1 tablet by mouth daily. - Oral    Sent to pharmacy as: Lisinopril-hydroCHLOROthiazide 20-25 MG Oral Tablet (Zestoretic)    E-Prescribing Status: Receipt confirmed by pharmacy (7/20/2022 10:56 AM CDT)

## 2022-11-08 ENCOUNTER — TELEPHONE (OUTPATIENT)
Dept: INTERNAL MEDICINE CLINIC | Facility: CLINIC | Age: 62
End: 2022-11-08

## 2022-11-09 ENCOUNTER — OFFICE VISIT (OUTPATIENT)
Dept: INTERNAL MEDICINE CLINIC | Facility: CLINIC | Age: 62
End: 2022-11-09
Payer: MEDICAID

## 2022-11-09 ENCOUNTER — TELEPHONE (OUTPATIENT)
Dept: INTERNAL MEDICINE CLINIC | Facility: CLINIC | Age: 62
End: 2022-11-09

## 2022-11-09 ENCOUNTER — LAB ENCOUNTER (OUTPATIENT)
Dept: LAB | Facility: HOSPITAL | Age: 62
End: 2022-11-09
Attending: PHYSICIAN ASSISTANT
Payer: MEDICAID

## 2022-11-09 VITALS
RESPIRATION RATE: 22 BRPM | TEMPERATURE: 98 F | SYSTOLIC BLOOD PRESSURE: 129 MMHG | HEART RATE: 120 BPM | OXYGEN SATURATION: 98 % | HEIGHT: 67 IN | WEIGHT: 249 LBS | BODY MASS INDEX: 39.08 KG/M2 | DIASTOLIC BLOOD PRESSURE: 71 MMHG

## 2022-11-09 DIAGNOSIS — M79.662 PAIN AND SWELLING OF LEFT LOWER LEG: Primary | ICD-10-CM

## 2022-11-09 DIAGNOSIS — M79.89 PAIN AND SWELLING OF LEFT LOWER LEG: Primary | ICD-10-CM

## 2022-11-09 DIAGNOSIS — R11.0 NAUSEA: ICD-10-CM

## 2022-11-09 DIAGNOSIS — R05.1 ACUTE COUGH: ICD-10-CM

## 2022-11-09 LAB — D DIMER PPP FEU-MCNC: 0.93 UG/ML FEU (ref ?–0.6)

## 2022-11-09 PROCEDURE — 99214 OFFICE O/P EST MOD 30 MIN: CPT | Performed by: PHYSICIAN ASSISTANT

## 2022-11-09 PROCEDURE — 36415 COLL VENOUS BLD VENIPUNCTURE: CPT | Performed by: PHYSICIAN ASSISTANT

## 2022-11-09 PROCEDURE — 3074F SYST BP LT 130 MM HG: CPT | Performed by: PHYSICIAN ASSISTANT

## 2022-11-09 PROCEDURE — 3008F BODY MASS INDEX DOCD: CPT | Performed by: PHYSICIAN ASSISTANT

## 2022-11-09 PROCEDURE — 85379 FIBRIN DEGRADATION QUANT: CPT | Performed by: PHYSICIAN ASSISTANT

## 2022-11-09 PROCEDURE — 3078F DIAST BP <80 MM HG: CPT | Performed by: PHYSICIAN ASSISTANT

## 2022-11-09 RX ORDER — ALBUTEROL SULFATE 90 UG/1
2 AEROSOL, METERED RESPIRATORY (INHALATION) EVERY 6 HOURS PRN
Qty: 1 EACH | Refills: 0 | Status: SHIPPED | OUTPATIENT
Start: 2022-11-09

## 2022-11-09 RX ORDER — ONDANSETRON 4 MG/1
4 TABLET, FILM COATED ORAL EVERY 8 HOURS PRN
Qty: 20 TABLET | Refills: 0 | Status: SHIPPED | OUTPATIENT
Start: 2022-11-09

## 2022-11-09 NOTE — TELEPHONE ENCOUNTER
Spoke with patient, (  Name and  verified ) informed of 's   instructions below    Future Appointments   Date Time Provider Esperanza Gama   2022  4:40 PM Gerry Levine PA-C [facility]       Informed mask is required for building entry and appointment. Patient verbalizes understanding and agrees.

## 2022-11-09 NOTE — TELEPHONE ENCOUNTER
Spoke to patient as doctor on call. Pt has lost weight over the last few months. She donated plasma yesterday. THis morning she had bad calf pain in left leg. The left leg is a little red and swollen. Has a slight cough. Some tenderness. She is concerned about possible blood clot.  She will send a picture via [facility] to evaluate

## 2022-11-09 NOTE — TELEPHONE ENCOUNTER
(Message Delivered)   D E L I V E R I E S :  2022 06:22p           AMANDA         Delivered  ------------ F O L L O W - U P------------- :  2022 06:22p AMANDA  2ND PAGE, CONFIRMED INFO AND GOT Adonis Vega ======================================================================  Paging    Message # [phone]         2022 07:22p   [[email or web address]]  To:  From:  SOCRATES Berrios MD:  Phone#:  ----------------------------------------------------------------------  [phone]  JLUIS ROBLES    [birth year]  RE  LEG PAIN IN CALF AND IS A LITTLE RED. NOT SURE IF THIS IS SIGNS OF A BLOOD CLOT.   Paged at  number :  PAGE: [phone] at :  19:22

## 2022-11-09 NOTE — TELEPHONE ENCOUNTER
Please call patient to triage. Patient sent photos of her legs and wants to know if she has a blood clot. 11/8/22    From: Justo Garcia  To: Chelly Arias MD  Sent: 11/8/2022  7:58 PM CST  Subject: Left leg question    Checking if it looks like a blood clot?

## 2022-11-09 NOTE — TELEPHONE ENCOUNTER
Call pt. I have reviewed the pictures of her legs. There is nothing that looks definitely like a blood clot. But it cannot be said for sure just based on the picture.  She should come in to be seen today for evaluation

## 2022-11-10 ENCOUNTER — HOSPITAL ENCOUNTER (OUTPATIENT)
Dept: ULTRASOUND IMAGING | Facility: HOSPITAL | Age: 62
Discharge: HOME | End: 2022-11-10
Attending: PHYSICIAN ASSISTANT
Payer: MEDICAID

## 2022-11-10 DIAGNOSIS — M79.89 PAIN AND SWELLING OF RIGHT LOWER LEG: ICD-10-CM

## 2022-11-10 DIAGNOSIS — M79.661 PAIN AND SWELLING OF RIGHT LOWER LEG: ICD-10-CM

## 2022-11-10 PROCEDURE — 93971 EXTREMITY STUDY: CPT | Performed by: PHYSICIAN ASSISTANT

## 2022-11-14 ENCOUNTER — TELEPHONE (OUTPATIENT)
Dept: INTERNAL MEDICINE CLINIC | Facility: CLINIC | Age: 62
End: 2022-11-14

## 2022-11-14 NOTE — TELEPHONE ENCOUNTER
Patient was seen in the office on 11/9/22 by Michael Arias. She is calling today as she has been applying heat to the left leg where she has a blood clot, she's taking aspirin 325 mg twice daily as told by Colleen and will continue that for 2 weeks. She is sending a media picture due to seeing a red spot on her left leg. Not raised, hard or painful per patient. Advised not to rub it. Please review media and please correct documentation as it states in notes right leg when it's not the right leg. Thanks.

## 2022-11-29 DIAGNOSIS — R05.1 ACUTE COUGH: ICD-10-CM

## 2022-11-30 RX ORDER — ALBUTEROL SULFATE 90 UG/1
AEROSOL, METERED RESPIRATORY (INHALATION)
Qty: 8.5 G | Refills: 0 | Status: SHIPPED | OUTPATIENT
Start: 2022-11-30

## 2022-11-30 NOTE — TELEPHONE ENCOUNTER
rx sent 3 weeks ago  Refill passed per [place] [employer] [facility] protocol.       Requested Prescriptions   Pending Prescriptions Disp Refills    ALBUTEROL 108 (90 Base) MCG/ACT Inhalation Aero Soln [Pharmacy Med Name: ALBUTEROL HFA INH (200 PUFFS)8.5GM] 8.5 g 0     Sig: INHALE 2 PUFFS INTO THE LUNGS EVERY 6 AS NEEDED FOR WHEEZING OR SHORTNESS OF BREATH       Asthma & COPD Medication Protocol Passed - 11/29/2022  4:39 PM        Passed - In person appointment or virtual visit in the past 6 mos or appointment in next 3 mos     Recent Outpatient Visits              3 weeks ago Pain and swelling of left lower leg    [facility], [address] [place], PA-C    Office Visit    9 months ago HTN (hypertension), benign    Diane Mcdonnell Tarry Cornell, MD    Office Visit    1 year ago Thickened endometrium [facility] for Health, [address], Wilner Moran MD    Office Visit    1 year ago [address], [address], Paige Lombard, MD    Telemedicine    2 years ago     [facility] GI PROCEDURE    Nurse Only                                Recent Outpatient Visits              3 weeks ago Pain and swelling of left lower leg    [facility], [address] [place], PA-C    Office Visit    9 months ago HTN (hypertension), benign    Diane Mcdonnell Tarry Cornell, MD    Office Visit    1 year ago Thickened endometrium [facility] for Adriano Ramírez, Wilner Moran MD    [facility]    [facility]    Carlie Pearson [facility]    Telemedicine    2 years ago     [facility] GI PROCEDURE    Nurse Only

## 2023-01-21 DIAGNOSIS — I10 HTN (HYPERTENSION), BENIGN: ICD-10-CM

## 2023-01-23 RX ORDER — LISINOPRIL AND HYDROCHLOROTHIAZIDE 25; 20 MG/1; MG/1
1 TABLET ORAL DAILY
Qty: 90 TABLET | Refills: 1 | Status: SHIPPED | OUTPATIENT
Start: 2023-01-23

## 2023-03-09 RX ORDER — METOPROLOL TARTRATE 50 MG/1
50 TABLET, FILM COATED ORAL 2 TIMES DAILY
Qty: 180 TABLET | Refills: 3 | Status: SHIPPED | OUTPATIENT
Start: 2023-03-09

## 2023-03-09 NOTE — TELEPHONE ENCOUNTER
Please review; protocol failed/ no protocol  Medication pended for your review and approval.     Requested Prescriptions   Pending Prescriptions Disp Refills    METOPROLOL TARTRATE 50 MG Oral Tab [Pharmacy Med Name: METOPROLOL TARTRATE 50MG TABLETS] 180 tablet 1     Sig: TAKE 1 TABLET(50 MG) BY MOUTH TWICE DAILY       Hypertensive Medications Protocol Failed - 3/9/2023  8:09 AM        Failed - CMP or BMP in past 6 months     No results found for this or any previous visit (from the past 4392 hour(s)).             Passed - In person appointment in the past 12 or next 3 months     Recent Outpatient Visits              4 months ago Pain and swelling of left lower leg    [facility], Wilner Zhong [place], PA-C    Office Visit    1 year ago HTN (hypertension), benign    Sonido Justice MD    Office Visit    1 year ago Thickened endometrium    [facility], [address], [address] - OB/GYN Palma Sykes MD    Office Visit    2 years ago COVID-19    [facility] Phi, [place] Nicky Skiff, MD    Telemedicine    2 years ago     [facility] Carranza, [address], [place]    Nurse Only                      Passed - Last BP reading less than 140/90     BP Readings from Last 1 Encounters:  11/09/22 : 129/71              Passed - In person appointment or virtual visit in the past 6 months     Recent Outpatient Visits              4 months ago Pain and swelling of left lower leg    [facility], Rhona Zhong [place], PA-C    Office Visit    1 year ago HTN (hypertension), benign    Charlie Connolly MD    Office Visit    1 year ago Thickened endometrium    [facility], [address] Palma Sykes MD    Office Visit    2 years ago Rakel Foster Siobhan Chapman, Katheryn Gutierrez MD    Telemedicine    2 years ago     Syd Born, [address], [place]    Nurse Only                      Passed - [facility] or GFRNAA > 50     GFR Evaluation  GFRNAA: 71 , resulted on 6/10/2022

## 2023-06-01 RX ORDER — ATORVASTATIN CALCIUM 10 MG/1
10 TABLET, FILM COATED ORAL NIGHTLY
Qty: 90 TABLET | Refills: 3 | Status: SHIPPED | OUTPATIENT
Start: 2023-06-01

## 2023-06-01 NOTE — TELEPHONE ENCOUNTER
Refill passed per [place] [employer] protocol.     Requested Prescriptions   Pending Prescriptions Disp Refills    ATORVASTATIN 10 MG Oral Tab [Pharmacy Med Name: ATORVASTATIN 10MG TABLETS] 90 tablet 1     Sig: TAKE 1 TABLET(10 MG) BY MOUTH EVERY NIGHT       Cholesterol Medication Protocol Passed - 6/1/2023 12:34 PM        Passed - ALT in past 12 months        Passed - LDL in past 12 months        Passed - Last ALT < 80     Lab Results   Component Value Date    ALT 27 06/10/2022             Passed - Last LDL < 130     Lab Results   Component Value Date     (H) 06/10/2022               Passed - In person appointment or virtual visit in the past 12 mos or appointment in next 3 mos     Recent Outpatient Visits              6 months ago Pain and swelling of left lower leg    [facility], [address] [facility], PA-    Office Visit    1 year ago HTN (hypertension), benign    Wilner Pozo MD    Office Visit    1 year ago Thickened endometrium    [facility], [address], [place] - OB/GYN Neida Yoon MD    Office Visit    2 years ago COVID-19    Wilner Pozo MD    Telemedicine    2 years ago     [address] [facility],[address], [address], [place]    Nurse Only                           Recent Outpatient Visits              6 months ago Pain and swelling of left lower leg    [facility], [address] [facility], PA-C    Office Visit    1 year ago HTN (hypertension), benign    Lewis Pozo MD    Office Visit    1 year ago Thickened [facility], [address], [place] - OB/GYN Neida Yoon MD    Office Visit    2 years ago KATHERINEID-19    Lewis Pozo MD Telemedicine    2 years ago     [employer], [address], [address] Only

## 2023-06-07 RX ORDER — METOPROLOL TARTRATE 50 MG/1
50 TABLET, FILM COATED ORAL 2 TIMES DAILY
Qty: 180 TABLET | Refills: 3 | Status: SHIPPED | OUTPATIENT
Start: 2023-06-07

## 2023-06-07 NOTE — TELEPHONE ENCOUNTER
Please review. Protocol failed / Has no protocol. [employer] message sent to patient to schedule an office visit with PCP. Will also make a phone attempt. Requested Prescriptions   Pending Prescriptions Disp Refills    metoprolol tartrate 50 MG Oral Tab 180 tablet 3     Sig: Take 1 tablet (50 mg total) by mouth 2 (two) times daily. Hypertensive Medications Protocol Failed - 6/6/2023  7:23 PM        Failed - CMP or BMP in past 6 months     No results found for this or any previous visit (from the past 4392 hour(s)).               Failed - In person appointment or virtual visit in the past 6 months     Recent Outpatient Visits              7 months ago Pain and swelling of left lower leg    [facility], [address] [place] [address]    Office Visit    1 year ago HTN (hypertension), benign    Maureen Morales MD    Office Visit    1 year ago Thickened endometrium    [facility], [address], [facility] - OB/GYN Ranjit Delacruz MD    Office Visit    2 years ago [facility]-19 [facility]    Telemedicine    2 years ago     Yonny Vera, [address], [place]    Nurse Only                      Passed - In person appointment in the past 12 or next 3 months     Recent Outpatient Visits              7 months ago Pain and swelling of left lower leg    [facility], [address] [facility], [facility]    Office Visit    1 year ago HTN (hypertension), benign    [address] [facility], Saintclair Lusher, MD    Office Visit    1 year ago Thickened [facility], [address], [facility] - OB/GYN Ranjit Delacruz MD    Office Visit    2 years ago COVID-19 [facility]    Telemedicine    2 years ago     [address] [facility]    Nurse Only                      Passed - Last BP reading less than 140/90     BP Readings from Last 1 Encounters:  11/09/22 : 129/71                Passed - EGFRCR or GFRNAA > 50     GFR Evaluation  GFRNAA: 69 , resulted on 6/10/2022                  Recent Outpatient Visits              7 months ago Pain and swelling of left lower leg    [facility], 148 [facility] Umang Levine PA-C    Office Visit    1 year ago HTN (hypertension), benign    Tiffany Carr MD    Office Visit    1 year ago Thickened endometrium    [facility], [address], [place] - OB/GYN Radha Erazo MD    Office Visit    2 years ago [address], [place] Shonna Bain MD    Telemedicine    2 years ago     [address] [facility], [address], [place]    Nurse Only

## 2023-06-26 ENCOUNTER — TELEPHONE (OUTPATIENT)
Dept: INTERNAL MEDICINE CLINIC | Facility: CLINIC | Age: 63
End: 2023-06-26

## 2023-06-26 NOTE — TELEPHONE ENCOUNTER
Patient calling office, transferred to triage from Call Center. Vaginal spotting (blood) once every week over the past few years. Now more frequent. 4-5 times per week - scant to small amount of blood on a thin extra small pad liner. Denies bleeding is like a period flow. Sometimes spotting changes to yellow drainage. Denies pain. Was advised by OBGYN to do a biopsy of a growth in her urterus. But she never did. But says she is ready now to seek care. Triaged per protocol and advised care advice per protocol. Denies severe bleeding. Denies pain. Evaluation advised today or tomorrow.    She agrees to tomorrow  appointment made    Future Appointments   Date Time Provider Esperanza Gama   6/27/2023  8:40 AM Radha Borrego MD [facility]

## 2023-06-27 ENCOUNTER — LAB ENCOUNTER (OUTPATIENT)
Dept: LAB | Age: 63
End: 2023-06-27
Attending: INTERNAL MEDICINE
Payer: MEDICAID

## 2023-06-27 ENCOUNTER — OFFICE VISIT (OUTPATIENT)
Dept: INTERNAL MEDICINE CLINIC | Facility: CLINIC | Age: 63
End: 2023-06-27

## 2023-06-27 VITALS
HEIGHT: 67 IN | OXYGEN SATURATION: 98 % | WEIGHT: 240 LBS | HEART RATE: 72 BPM | BODY MASS INDEX: 37.67 KG/M2 | DIASTOLIC BLOOD PRESSURE: 76 MMHG | RESPIRATION RATE: 18 BRPM | SYSTOLIC BLOOD PRESSURE: 124 MMHG

## 2023-06-27 DIAGNOSIS — N93.9 VAGINAL BLEEDING: Primary | ICD-10-CM

## 2023-06-27 DIAGNOSIS — Z01.419 ENCOUNTER FOR ROUTINE GYNECOLOGICAL EXAMINATION WITH PAPANICOLAOU SMEAR OF CERVIX: ICD-10-CM

## 2023-06-27 DIAGNOSIS — F32.A ANXIETY AND DEPRESSION: ICD-10-CM

## 2023-06-27 DIAGNOSIS — T78.40XS ALLERGY, SEQUELA: ICD-10-CM

## 2023-06-27 DIAGNOSIS — E11.9 TYPE 2 DIABETES MELLITUS WITHOUT COMPLICATION, WITHOUT LONG-TERM CURRENT USE OF INSULIN (HCC): ICD-10-CM

## 2023-06-27 DIAGNOSIS — F41.9 ANXIETY AND DEPRESSION: ICD-10-CM

## 2023-06-27 DIAGNOSIS — Z12.31 ENCOUNTER FOR SCREENING MAMMOGRAM FOR MALIGNANT NEOPLASM OF BREAST: ICD-10-CM

## 2023-06-27 DIAGNOSIS — I10 HTN (HYPERTENSION), BENIGN: ICD-10-CM

## 2023-06-27 DIAGNOSIS — Z12.11 COLON CANCER SCREENING: ICD-10-CM

## 2023-06-27 PROBLEM — T78.40XA ALLERGIES: Status: ACTIVE | Noted: 2023-06-27

## 2023-06-27 LAB
ALBUMIN SERPL-MCNC: 3.2 G/DL (ref 3.4–5)
ALBUMIN/GLOB SERPL: 0.9 {RATIO} (ref 1–2)
ALP LIVER SERPL-CCNC: 86 U/L
ALT SERPL-CCNC: 35 U/L
ANION GAP SERPL CALC-SCNC: 7 MMOL/L (ref 0–18)
AST SERPL-CCNC: 22 U/L (ref 15–37)
BILIRUB SERPL-MCNC: 1.1 MG/DL (ref 0.1–2)
BUN BLD-MCNC: 14 MG/DL (ref 7–18)
BUN/CREAT SERPL: 16.5 (ref 10–20)
CALCIUM BLD-MCNC: 9.2 MG/DL (ref 8.5–10.1)
CHLORIDE SERPL-SCNC: 109 MMOL/L (ref 98–112)
CHOLEST SERPL-MCNC: 153 MG/DL (ref ?–200)
CO2 SERPL-SCNC: 28 MMOL/L (ref 21–32)
CREAT BLD-MCNC: 0.85 MG/DL
CREAT UR-SCNC: 316 MG/DL
DEPRECATED RDW RBC AUTO: 40.3 FL (ref 35.1–46.3)
ERYTHROCYTE [DISTWIDTH] IN BLOOD BY AUTOMATED COUNT: 12.6 % (ref 11–15)
EST. AVERAGE GLUCOSE BLD GHB EST-MCNC: 128 MG/DL (ref 68–126)
FASTING PATIENT LIPID ANSWER: NO
FASTING STATUS PATIENT QL REPORTED: NO
GFR SERPLBLD BASED ON 1.73 SQ M-ARVRAT: 78 ML/MIN/1.73M2 (ref 60–?)
GLOBULIN PLAS-MCNC: 3.4 G/DL (ref 2.8–4.4)
GLUCOSE BLD-MCNC: 105 MG/DL (ref 70–99)
HBA1C MFR BLD: 6.1 % (ref ?–5.7)
HCT VFR BLD AUTO: 47.7 %
HDLC SERPL-MCNC: 42 MG/DL (ref 40–59)
HGB BLD-MCNC: 16.1 G/DL
LDLC SERPL CALC-MCNC: 90 MG/DL (ref ?–100)
MCH RBC QN AUTO: 29.4 PG (ref 26–34)
MCHC RBC AUTO-ENTMCNC: 33.8 G/DL (ref 31–37)
MCV RBC AUTO: 87.2 FL
MICROALBUMIN UR-MCNC: 2.84 MG/DL
MICROALBUMIN/CREAT 24H UR-RTO: 9 UG/MG (ref ?–30)
NONHDLC SERPL-MCNC: 111 MG/DL (ref ?–130)
OSMOLALITY SERPL CALC.SUM OF ELEC: 299 MOSM/KG (ref 275–295)
PLATELET # BLD AUTO: 303 10(3)UL (ref 150–450)
POTASSIUM SERPL-SCNC: 4 MMOL/L (ref 3.5–5.1)
PROT SERPL-MCNC: 6.6 G/DL (ref 6.4–8.2)
RBC # BLD AUTO: 5.47 X10(6)UL
SODIUM SERPL-SCNC: 144 MMOL/L (ref 136–145)
TRIGL SERPL-MCNC: 117 MG/DL (ref 30–149)
TSI SER-ACNC: 2.57 MIU/ML (ref 0.36–3.74)
VLDLC SERPL CALC-MCNC: 19 MG/DL (ref 0–30)
WBC # BLD AUTO: 7.4 X10(3) UL (ref 4–11)

## 2023-06-27 PROCEDURE — 3074F SYST BP LT 130 MM HG: CPT | Performed by: INTERNAL MEDICINE

## 2023-06-27 PROCEDURE — 83036 HEMOGLOBIN GLYCOSYLATED A1C: CPT | Performed by: INTERNAL MEDICINE

## 2023-06-27 PROCEDURE — 3078F DIAST BP <80 MM HG: CPT | Performed by: INTERNAL MEDICINE

## 2023-06-27 PROCEDURE — 80053 COMPREHEN METABOLIC PANEL: CPT | Performed by: INTERNAL MEDICINE

## 2023-06-27 PROCEDURE — 82043 UR ALBUMIN QUANTITATIVE: CPT | Performed by: INTERNAL MEDICINE

## 2023-06-27 PROCEDURE — 3008F BODY MASS INDEX DOCD: CPT | Performed by: INTERNAL MEDICINE

## 2023-06-27 PROCEDURE — 36415 COLL VENOUS BLD VENIPUNCTURE: CPT | Performed by: INTERNAL MEDICINE

## 2023-06-27 PROCEDURE — 99215 OFFICE O/P EST HI 40 MIN: CPT | Performed by: INTERNAL MEDICINE

## 2023-06-27 PROCEDURE — 85027 COMPLETE CBC AUTOMATED: CPT | Performed by: INTERNAL MEDICINE

## 2023-06-27 PROCEDURE — 80061 LIPID PANEL: CPT | Performed by: INTERNAL MEDICINE

## 2023-06-27 PROCEDURE — 82570 ASSAY OF URINE CREATININE: CPT | Performed by: INTERNAL MEDICINE

## 2023-06-27 PROCEDURE — 84443 ASSAY THYROID STIM HORMONE: CPT | Performed by: INTERNAL MEDICINE

## 2023-06-27 PROCEDURE — 3044F HG A1C LEVEL LT 7.0%: CPT | Performed by: INTERNAL MEDICINE

## 2023-06-27 RX ORDER — LEVOCETIRIZINE DIHYDROCHLORIDE 5 MG/1
5 TABLET, FILM COATED ORAL DAILY PRN
Qty: 90 TABLET | Refills: 0 | Status: SHIPPED | OUTPATIENT
Start: 2023-06-27

## 2023-06-27 RX ORDER — LISINOPRIL AND HYDROCHLOROTHIAZIDE 25; 20 MG/1; MG/1
1 TABLET ORAL DAILY
Qty: 90 TABLET | Refills: 1 | Status: SHIPPED | OUTPATIENT
Start: 2023-06-27

## 2023-06-28 ENCOUNTER — HOSPITAL ENCOUNTER (OUTPATIENT)
Dept: MAMMOGRAPHY | Age: 63
Discharge: HOME | End: 2023-06-28
Attending: INTERNAL MEDICINE
Payer: MEDICAID

## 2023-06-28 DIAGNOSIS — Z12.31 ENCOUNTER FOR SCREENING MAMMOGRAM FOR MALIGNANT NEOPLASM OF BREAST: ICD-10-CM

## 2023-06-28 PROCEDURE — 77063 BREAST TOMOSYNTHESIS BI: CPT | Performed by: INTERNAL MEDICINE

## 2023-06-28 PROCEDURE — 77067 SCR MAMMO BI INCL CAD: CPT | Performed by: INTERNAL MEDICINE

## 2023-07-01 ENCOUNTER — TELEPHONE (OUTPATIENT)
Facility: CLINIC | Age: 63
End: 2023-07-01

## 2023-07-05 ENCOUNTER — HOSPITAL ENCOUNTER (OUTPATIENT)
Dept: MAMMOGRAPHY | Facility: HOSPITAL | Age: 63
Discharge: HOME | End: 2023-07-05
Attending: INTERNAL MEDICINE
Payer: MEDICAID

## 2023-07-05 DIAGNOSIS — R92.8 ABNORMAL MAMMOGRAM: ICD-10-CM

## 2023-07-05 PROCEDURE — 77065 DX MAMMO INCL CAD UNI: CPT | Performed by: INTERNAL MEDICINE

## 2023-07-05 PROCEDURE — 77061 BREAST TOMOSYNTHESIS UNI: CPT | Performed by: INTERNAL MEDICINE

## 2023-07-11 ENCOUNTER — HOSPITAL ENCOUNTER (OUTPATIENT)
Dept: ULTRASOUND IMAGING | Facility: HOSPITAL | Age: 63
Discharge: HOME | End: 2023-07-11
Attending: INTERNAL MEDICINE
Payer: MEDICAID

## 2023-07-11 DIAGNOSIS — R92.8 ABNORMAL MAMMOGRAM: ICD-10-CM

## 2023-07-11 PROCEDURE — 76642 ULTRASOUND BREAST LIMITED: CPT | Performed by: INTERNAL MEDICINE

## 2023-08-16 ENCOUNTER — OFFICE VISIT (OUTPATIENT)
Dept: OBGYN CLINIC | Facility: CLINIC | Age: 63
End: 2023-08-16

## 2023-08-16 VITALS
SYSTOLIC BLOOD PRESSURE: 136 MMHG | BODY MASS INDEX: 39 KG/M2 | DIASTOLIC BLOOD PRESSURE: 86 MMHG | WEIGHT: 247.19 LBS | HEART RATE: 76 BPM

## 2023-08-16 DIAGNOSIS — Z12.4 SCREENING FOR CERVICAL CANCER: ICD-10-CM

## 2023-08-16 DIAGNOSIS — R92.8 ABNORMAL MAMMOGRAM OF RIGHT BREAST: ICD-10-CM

## 2023-08-16 DIAGNOSIS — Z01.411 ENCOUNTER FOR GYNECOLOGICAL EXAMINATION WITH ABNORMAL FINDING: Primary | ICD-10-CM

## 2023-08-16 DIAGNOSIS — Z12.4 SCREENING FOR MALIGNANT NEOPLASM OF CERVIX: ICD-10-CM

## 2023-08-16 DIAGNOSIS — N95.0 POSTMENOPAUSAL BLEEDING: ICD-10-CM

## 2023-08-16 PROCEDURE — 99396 PREV VISIT EST AGE 40-64: CPT | Performed by: OBSTETRICS & GYNECOLOGY

## 2023-08-16 PROCEDURE — 3075F SYST BP GE 130 - 139MM HG: CPT | Performed by: OBSTETRICS & GYNECOLOGY

## 2023-08-16 PROCEDURE — 58100 BIOPSY OF UTERUS LINING: CPT | Performed by: OBSTETRICS & GYNECOLOGY

## 2023-08-16 PROCEDURE — 3079F DIAST BP 80-89 MM HG: CPT | Performed by: OBSTETRICS & GYNECOLOGY

## 2023-08-17 LAB — HPV I/H RISK 1 DNA SPEC QL NAA+PROBE: NEGATIVE

## 2023-08-29 DIAGNOSIS — T78.40XS ALLERGY, SEQUELA: ICD-10-CM

## 2023-08-29 RX ORDER — LEVOCETIRIZINE DIHYDROCHLORIDE 5 MG/1
5 TABLET, FILM COATED ORAL DAILY PRN
Qty: 90 TABLET | Refills: 3 | Status: SHIPPED | OUTPATIENT
Start: 2023-08-29

## 2023-08-30 ENCOUNTER — OFFICE VISIT (OUTPATIENT)
Dept: INTERNAL MEDICINE CLINIC | Facility: CLINIC | Age: 63
End: 2023-08-30

## 2023-08-30 VITALS
SYSTOLIC BLOOD PRESSURE: 121 MMHG | BODY MASS INDEX: 38.24 KG/M2 | TEMPERATURE: 98 F | HEART RATE: 65 BPM | HEIGHT: 67 IN | WEIGHT: 243.63 LBS | OXYGEN SATURATION: 97 % | DIASTOLIC BLOOD PRESSURE: 74 MMHG

## 2023-08-30 DIAGNOSIS — E11.9 TYPE 2 DIABETES MELLITUS WITHOUT COMPLICATION, WITHOUT LONG-TERM CURRENT USE OF INSULIN (HCC): ICD-10-CM

## 2023-08-30 DIAGNOSIS — K04.7 TOOTH INFECTION: Primary | ICD-10-CM

## 2023-08-30 DIAGNOSIS — Z12.11 COLON CANCER SCREENING: ICD-10-CM

## 2023-08-30 DIAGNOSIS — K05.10 GINGIVITIS: ICD-10-CM

## 2023-08-30 PROCEDURE — 3008F BODY MASS INDEX DOCD: CPT | Performed by: INTERNAL MEDICINE

## 2023-08-30 PROCEDURE — 3074F SYST BP LT 130 MM HG: CPT | Performed by: INTERNAL MEDICINE

## 2023-08-30 PROCEDURE — 99214 OFFICE O/P EST MOD 30 MIN: CPT | Performed by: INTERNAL MEDICINE

## 2023-08-30 PROCEDURE — 3078F DIAST BP <80 MM HG: CPT | Performed by: INTERNAL MEDICINE

## 2023-08-30 RX ORDER — AMOXICILLIN AND CLAVULANATE POTASSIUM 875; 125 MG/1; MG/1
1 TABLET, FILM COATED ORAL 2 TIMES DAILY
Qty: 20 TABLET | Refills: 0 | Status: SHIPPED | OUTPATIENT
Start: 2023-08-30

## 2023-09-11 NOTE — PROGRESS NOTES
Subjective:   Patient ID: Ben Mehta is a 66year old female. HPI   and . On review, she reports recurrent  bleeding. She had work-up by Dr Magali Zhou in 2020 which lead to operative hysteroscopy / polypectomy / D and C with benign results. New family medical issue with Mom passing from CAD. History/Other:   Review of Systems   Constitutional:  Negative for appetite change, fatigue and unexpected weight change. Eyes:  Negative for visual disturbance. Respiratory:  Negative for cough and shortness of breath. Cardiovascular:  Negative for chest pain, palpitations and leg swelling. Gastrointestinal:  Negative for abdominal distention, abdominal pain, blood in stool, constipation and diarrhea. Genitourinary:  Positive for vaginal bleeding. Negative for dyspareunia, dysuria, frequency, pelvic pain and urgency. Musculoskeletal:  Negative for arthralgias and myalgias. Skin:  Negative for rash. Neurological:  Negative for weakness, numbness and headaches. Psychiatric/Behavioral:  Negative for dysphoric mood. The patient is not nervous/anxious. Current Outpatient Medications   Medication Sig Dispense Refill    BABY ASPIRIN OR Take 1 tablet by mouth daily. lisinopril-hydroCHLOROthiazide 20-25 MG Oral Tab Take 1 tablet by mouth daily. 90 tablet 1    metoprolol tartrate 50 MG Oral Tab Take 1 tablet (50 mg total) by mouth 2 (two) times daily. 180 tablet 3    atorvastatin 10 MG Oral Tab Take 1 tablet (10 mg total) by mouth nightly. 90 tablet 3    albuterol 108 (90 Base) MCG/ACT Inhalation Aero Soln Inhale 2 puffs into the lungs every 6 (six) hours as needed for Wheezing or Shortness of Breath. 8.5 g 0    amoxicillin clavulanate 875-125 MG Oral Tab Take 1 tablet by mouth 2 (two) times daily. 20 tablet 0    levocetirizine 5 MG Oral Tab Take 1 tablet (5 mg total) by mouth daily as needed for Allergies.  90 tablet 3     Allergies:No Known Allergies    Objective:   Physical Exam  Constitutional:       General: She is not in acute distress. Appearance: She is well-developed. She is not diaphoretic. Neck:      Thyroid: No thyromegaly. Cardiovascular:      Rate and Rhythm: Normal rate and regular rhythm. Heart sounds: Normal heart sounds. No murmur heard. Pulmonary:      Effort: Pulmonary effort is normal.      Breath sounds: Normal breath sounds. No wheezing or rales. Chest:   Breasts:     Right: Normal. No mass, nipple discharge, skin change or tenderness. Left: Normal. No mass, nipple discharge, skin change or tenderness. Comments:     Abdominal:      General: There is no distension. Palpations: Abdomen is soft. There is no mass. Tenderness: There is no abdominal tenderness. There is no guarding or rebound. Genitourinary:     Labia:         Right: No rash or lesion. Left: No rash or lesion. Vagina: Normal. No vaginal discharge. Cervix: No cervical motion tenderness or discharge. Uterus: Not enlarged and not tender. Adnexa:         Right: No mass, tenderness or fullness. Left: No mass, tenderness or fullness. Musculoskeletal:         General: No tenderness. Cervical back: Neck supple. Lymphadenopathy:      Cervical: No cervical adenopathy. Upper Body:      Right upper body: No supraclavicular, axillary or pectoral adenopathy. Left upper body: No supraclavicular, axillary or pectoral adenopathy. Neurological:      Mental Status: She is alert. Assessment & Plan:   Encounter for gynecological examination with abnormal finding  (primary encounter diagnosis)  Abnormal mammogram of right breast  Screening for malignant neoplasm of cervix  Postmenopausal bleeding  Screening for cervical cancer  I discussed need for repeat endometrial sampling with possible US to follow.    Orders Placed This Encounter      Hpv Dna  High Risk , Thin Prep Collect      BIOPSY OF UTERUS LINING (55167) ThinPrep PAP Smear      Specimen to Pathology Tissue      THIN PREP PAP (Q)      Meds This Visit:  Requested Prescriptions      No prescriptions requested or ordered in this encounter       Imaging & Referrals:  [facility] MAYA 2D+3D DIAGNOSTIC [facility] RIGHT (CPT=[id number])

## 2023-09-18 ENCOUNTER — TELEPHONE (OUTPATIENT)
Dept: INTERNAL MEDICINE CLINIC | Facility: CLINIC | Age: 63
End: 2023-09-18

## 2023-09-30 DIAGNOSIS — T78.40XS ALLERGY, SEQUELA: ICD-10-CM

## 2023-09-30 RX ORDER — LEVOCETIRIZINE DIHYDROCHLORIDE 5 MG/1
5 TABLET, FILM COATED ORAL DAILY PRN
Qty: 90 TABLET | Refills: 3 | OUTPATIENT
Start: 2023-09-30

## 2023-10-02 DIAGNOSIS — T78.40XS ALLERGY, SEQUELA: ICD-10-CM

## 2023-10-03 RX ORDER — LEVOCETIRIZINE DIHYDROCHLORIDE 5 MG/1
5 TABLET, FILM COATED ORAL DAILY PRN
Qty: 90 TABLET | Refills: 3 | Status: SHIPPED | OUTPATIENT
Start: 2023-10-03

## 2023-10-03 NOTE — TELEPHONE ENCOUNTER
Refill passed per [place] [employer] protocol.     Requested Prescriptions   Pending Prescriptions Disp Refills    LEVOCETIRIZINE 5 MG Oral Tab [Pharmacy Med Name: LEVOCETIRIZINE 5MG TABLETS] 90 tablet 3     Sig: TAKE 1 TABLET(5 MG) BY MOUTH DAILY AS NEEDED FOR ALLERGIES       Allergy Medication Protocol Passed - 10/2/2023  3:41 AM        Passed - In person appointment or virtual visit in the past 12 mos or appointment in next 3 mos     Recent Outpatient Visits              1 month ago Tooth infection    [address], Saintclair Lusher, MD    Office Visit    1 month ago Encounter for gynecological examination with abnormal finding    [address], [address] [facility], Jori Austin DO    Office Visit    3 months ago Vaginal bleeding    [address], Saintclair Lusher, MD    Office Visit    10 months ago [facility] and swelling of left lower leg    [facility] [address] [place] [address], PA-    Office Visit    1 year ago HTN (hypertension), benign    [address], Saintclair Lusher, MD    Office Visit          Future Appointments         Provider Department Appt Notes    In 3 months [facility] OF THE [facility]                           Recent Outpatient Visits              1 month ago Tooth infection    [address], Saintclair Lusher, MD    Office Visit    1 month ago Encounter for gynecological examination with abnormal finding    [facility] Jori Austin DO    Office Visit    3 months ago Vaginal bleeding    [address], Saintclair Lusher, MD    Office Visit    10 months ago Pain and swelling of left lower leg    [facility], [address] [place] [address], TILA    Office Visit    1 year ago HTN (hypertension), benign    Iraj Moore, MD    Office Visit            Future Appointments         Provider Department Appt Notes    In 3 months [facility] OF THE [address]

## 2023-10-25 DIAGNOSIS — I10 HTN (HYPERTENSION), BENIGN: ICD-10-CM

## 2023-10-25 RX ORDER — LISINOPRIL AND HYDROCHLOROTHIAZIDE 25; 20 MG/1; MG/1
1 TABLET ORAL DAILY
Qty: 90 TABLET | Refills: 3 | Status: SHIPPED | OUTPATIENT
Start: 2023-10-25

## 2023-10-25 NOTE — TELEPHONE ENCOUNTER
Refill passed per [address] [place] protocol. Requested Prescriptions   Pending Prescriptions Disp Refills    lisinopril-hydroCHLOROthiazide 20-25 MG Oral Tab [Pharmacy Med Name: LISINOPRIL-HCTZ 20/25MG TABLETS] 90 tablet 3     Sig: Take 1 tablet by mouth daily.        Hypertensive Medications Protocol Passed - 10/25/2023  3:41 AM        Passed - In person appointment in the past 12 or next 3 months     Recent Outpatient Visits              1 month ago Tooth infection    Lewis Alvarez MD    Office Visit    2 months ago Encounter for gynecological examination with abnormal finding    [address] [facility],[address], [address] [facility],     Office Visit    4 months ago Vaginal bleeding    Lewis Alvarez MD    Office Visit    11 months ago Pain and swelling of left lower leg    [facility], [address] [place] [address], PA-C    Office Visit    1 year ago HTN (hypertension), benign    Lewis Alvarze MD    Office Visit          Future Appointments         Provider Department Appt Notes    In 2 months [facility] OF THE [address]                       Passed - Last BP reading less than 140/90     BP Readings from Last 1 Encounters:  08/30/23 : 121/74              Passed - CMP or BMP in past 6 months     Recent Results (from the past 4392 hour(s))   Comp Metabolic Panel (14)    Collection Time: 06/27/23  9:44 AM   Result Value Ref Range    Glucose 105 (H) 70 - 99 mg/dL    Sodium 144 136 - 145 mmol/L    Potassium 4.0 3.5 - 5.1 mmol/L    Chloride 109 98 - 112 mmol/L    CO2 28.0 21.0 - 32.0 mmol/L    Anion Gap 7 0 - 18 mmol/L    BUN 14 7 - 18 mg/dL    Creatinine 0.85 0.55 - 1.02 mg/dL    BUN/CREA Ratio 16.5 10.0 - 20.0    Calcium, Total 9.2 8.5 - 10.1 mg/dL Calculated Osmolality 299 (H) 275 - 295 mOsm/kg    eGFR-Cr 78 >=60 mL/min/1.73m2    ALT 35 13 - 56 U/L    AST 22 15 - 37 U/L    Alkaline Phosphatase 86 50 - 130 U/L    Bilirubin, Total 1.1 0.1 - 2.0 mg/dL    Total Protein 6.6 6.4 - 8.2 g/dL    Albumin 3.2 (L) 3.4 - 5.0 g/dL    Globulin  3.4 2.8 - 4.4 g/dL    A/G Ratio 0.9 (L) 1.0 - 2.0    Patient Fasting for CMP? No      *Note: Due to a large number of results and/or encounters for the requested time period, some results have not been displayed. A complete set of results can be found in Results Review.                Passed - In person appointment or virtual visit in the past 6 months     Recent Outpatient Visits              1 month ago Tooth infection    [facility]er, Saintclair Lusher, MD    Office Visit    2 months ago Encounter for gynecological examination with abnormal finding    [address], 0[address] [facility] [place]     Office Visit    4 months ago Vaginal bleeding    Benson Grayer, Saintclair Lusher, MD    Office Visit    11 months ago Pain and swelling of left lower leg    [facility], [address] [place] [address] [place]    Office Visit    1 year ago HTN (hypertension), benign    Benson Grayer, Saintclair Lusher, MD    Office Visit          Future Appointments         Provider Department Appt Notes    In 2 months [facility] OF [place] [address] for [address] [place] [address] > 50     GFR Evaluation  EGFRCR: 78 , resulted on 6/27/2023                Recent Outpatient Visits              1 month ago Tooth infection    Benson Grayer, Saintclair Lusher, MD    Office Visit    2 months ago Encounter for gynecological examination with abnormal finding    [address], [address] [facility],[address], [address]    Office Visit    4 months ago Vaginal bleeding    Marybeth Pozo MD    Office Visit    11 months ago Pain and swelling of left lower leg    [facility], [address] [facility] [place] ChoudharymhUmang Ness PA-C    Office Visit    1 year ago HTN (hypertension), benign    Marybeth Pozo MD    Office Visit            Future Appointments         Provider Department Appt Notes    In 2 months Counts include 234 beds at the [facility] SYSTEM [address]

## 2023-11-28 ENCOUNTER — HOSPITAL ENCOUNTER (OUTPATIENT)
Age: 63
Discharge: HOME | End: 2023-11-28
Payer: MEDICAID

## 2023-11-28 VITALS
OXYGEN SATURATION: 98 % | DIASTOLIC BLOOD PRESSURE: 79 MMHG | SYSTOLIC BLOOD PRESSURE: 158 MMHG | RESPIRATION RATE: 20 BRPM | HEART RATE: 81 BPM | TEMPERATURE: 97 F

## 2023-11-28 DIAGNOSIS — J02.9 SORE THROAT: Primary | ICD-10-CM

## 2023-11-28 LAB — S PYO AG THROAT QL IA.RAPID: NEGATIVE

## 2023-11-28 PROCEDURE — 99202 OFFICE O/P NEW SF 15 MIN: CPT

## 2023-11-28 PROCEDURE — 99212 OFFICE O/P EST SF 10 MIN: CPT

## 2023-11-28 PROCEDURE — 87651 STREP A DNA AMP PROBE: CPT | Performed by: PHYSICIAN ASSISTANT

## 2023-12-23 DIAGNOSIS — I10 HTN (HYPERTENSION), BENIGN: ICD-10-CM

## 2023-12-24 RX ORDER — LISINOPRIL AND HYDROCHLOROTHIAZIDE 25; 20 MG/1; MG/1
1 TABLET ORAL DAILY
Qty: 90 TABLET | Refills: 3 | Status: SHIPPED | OUTPATIENT
Start: 2023-12-24

## 2023-12-24 NOTE — TELEPHONE ENCOUNTER
Failed Protocol/No Protocol. Requested Prescriptions   Pending Prescriptions Disp Refills    LISINOPRIL-HYDROCHLOROTHIAZIDE 20-25 MG Oral Tab [Pharmacy Med Name: LISINOPRIL-HCTZ 20/25MG TABLETS] 90 tablet 3     Sig: Take 1 tablet by mouth daily.        Hypertensive Medications Protocol Failed - 12/23/2023 11:41 AM        Failed - Last BP reading less than 140/90     BP Readings from Last 1 Encounters:   11/28/23 158/79               Passed - In person appointment in the past 12 or next 3 months     Recent Outpatient Visits              3 months ago Tooth infection    Lewis Carrion MD    Office Visit    4 months ago Encounter for gynecological examination with abnormal finding    [address], [address] [facility], [facility]     Office Visit    6 months ago Vaginal bleeding    Lewis Carrion MD    Office Visit    1 year ago Pain and swelling of left lower leg    [facility], [address] [place] [address], PA-C    Office Visit    1 year ago HTN (hypertension), benign    Lewis Carrion MD    Office Visit          Future Appointments         Provider Department Appt Notes    In 3 weeks [facility] THE [facility]                Passed - CMP or BMP in past 6 months     Recent Results (from the past 4392 hour(s))   Comp Metabolic Panel (14)    Collection Time: 06/27/23  9:44 AM   Result Value Ref Range    Glucose 105 (H) 70 - 99 mg/dL    Sodium 144 136 - 145 mmol/L    Potassium 4.0 3.5 - 5.1 mmol/L    Chloride 109 98 - 112 mmol/L    CO2 28.0 21.0 - 32.0 mmol/L    Anion Gap 7 0 - 18 mmol/L    BUN 14 7 - 18 mg/dL    Creatinine 0.85 0.55 - 1.02 mg/dL    BUN/CREA Ratio 16.5 10.0 - 20.0    Calcium, Total 9.2 8.5 - 10.1 mg/dL    Calculated Osmolality 299 (H) 275 - 295 mOsm/kg    eGFR-Cr 78 >=60 mL/min/1.73m2    ALT 35 13 - 56 U/L    AST 22 15 - 37 U/L    Alkaline Phosphatase 86 50 - 130 U/L    Bilirubin, Total 1.1 0.1 - 2.0 mg/dL    Total Protein 6.6 6.4 - 8.2 g/dL    Albumin 3.2 (L) 3.4 - 5.0 g/dL    Globulin  3.4 2.8 - 4.4 g/dL    A/G Ratio 0.9 (L) 1.0 - 2.0    Patient Fasting for CMP? No      *Note: Due to a large number of results and/or encounters for the requested time period, some results have not been displayed. A complete set of results can be found in Results Review.                Passed - In person appointment or virtual visit in the past 6 months     Recent Outpatient Visits              3 months ago Tooth infection    [address] Jose Berry MD    Office Visit    4 months ago Encounter for gynecological examination with abnormal finding    [address] [facility], [address] [facility], vEe Regan DO    Office Visit    6 months ago Vaginal bleeding    [address] Jose Berry MD    Office Visit    1 year ago Pain and swelling of left lower leg    [facility], [address] [place] [address] Umang Levine PA-C    Office Visit    1 year ago HTN (hypertension), benign    [address] Jose Berry MD    Office Visit          Future Appointments         Provider Department Appt Notes    In 3 weeks [facility] OF THE [facility] M[address]AV[address] for [address] [facility] or [facility] > 50     GFR Evaluation  EGFRCR: 78 , resulted on 6/27/2023               Future Appointments         Provider Department Appt Notes    In 3 weeks [facility] M[address] for Health           Recent Outpatient Visits              3 months ago Tooth infection    [address] Jose Berry MD    Office Visit    4 months ago Encounter for gynecological examination with abnormal finding    [address] Jaz Morrissey DO    Office Visit    6 months ago Vaginal bleeding    Giovani Beyer MD    Office Visit    1 year ago Pain and swelling of left lower leg    [facility], [address] [facility] Umang Levine, PA-C    Office Visit    1 year ago HTN (hypertension), benign    Giovani Beyer MD    Office Visit

## 2024-01-08 ENCOUNTER — TELEPHONE (OUTPATIENT)
Dept: FAMILY MEDICINE CLINIC | Facility: CLINIC | Age: 64
End: 2024-01-08

## 2024-01-15 ENCOUNTER — HOSPITAL ENCOUNTER (OUTPATIENT)
Dept: MAMMOGRAPHY | Facility: HOSPITAL | Age: 64
Discharge: HOME | End: 2024-01-15
Attending: OBSTETRICS & GYNECOLOGY
Payer: MEDICAID

## 2024-01-15 DIAGNOSIS — R92.8 ABNORMAL MAMMOGRAM OF RIGHT BREAST: ICD-10-CM

## 2024-01-15 PROCEDURE — 77061 BREAST TOMOSYNTHESIS UNI: CPT | Performed by: OBSTETRICS & GYNECOLOGY

## 2024-01-15 PROCEDURE — 77065 DX MAMMO INCL CAD UNI: CPT | Performed by: OBSTETRICS & GYNECOLOGY

## 2024-01-16 DIAGNOSIS — Z12.31 SCREENING MAMMOGRAM FOR BREAST CANCER: Primary | ICD-10-CM

## 2024-02-26 DIAGNOSIS — T78.40XS ALLERGY, SEQUELA: ICD-10-CM

## 2024-02-26 DIAGNOSIS — I10 HTN (HYPERTENSION), BENIGN: ICD-10-CM

## 2024-02-26 RX ORDER — LISINOPRIL AND HYDROCHLOROTHIAZIDE 25; 20 MG/1; MG/1
1 TABLET ORAL DAILY
Qty: 90 TABLET | Refills: 3 | OUTPATIENT
Start: 2024-02-26

## 2024-02-26 RX ORDER — METOPROLOL TARTRATE 50 MG/1
50 TABLET, FILM COATED ORAL 2 TIMES DAILY
Qty: 180 TABLET | Refills: 3 | OUTPATIENT
Start: 2024-02-26

## 2024-02-26 RX ORDER — ATORVASTATIN CALCIUM 10 MG/1
10 TABLET, FILM COATED ORAL NIGHTLY
Qty: 90 TABLET | Refills: 3 | OUTPATIENT
Start: 2024-02-26

## 2024-02-26 RX ORDER — LEVOCETIRIZINE DIHYDROCHLORIDE 5 MG/1
5 TABLET, FILM COATED ORAL DAILY PRN
Qty: 90 TABLET | Refills: 3 | OUTPATIENT
Start: 2024-02-26

## 2024-02-27 NOTE — TELEPHONE ENCOUNTER
REFILL REQUEST DENIED===with refills available. [employer] message sent .  lisinopril-hydroCHLOROthiazide 20-25 MG Oral Tab 90 tablet 3 12/24/2023 --    Sig - Route: Take 1 tablet by mouth daily. - Oral    Sent to pharmacy as: Lisinopril-hydroCHLOROthiazide 20-25 MG Oral Tablet (Zestoretic)    E-Prescribing Status: Receipt confirmed by pharmacy (12/24/2023  9:22 PM CST)      Associated Diagnoses    HTN (hypertension), benign        Pharmacy    [id number] [place] [address] [address] [facility], [phone], [phone]        Disp Refills Start End    levocetirizine 5 MG Oral Tab 90 tablet 3 10/3/2023 --    Sig - Route: Take 1 tablet (5 mg total) by mouth daily as needed for Allergies. - Oral    Sent to pharmacy as: Levocetirizine Dihydrochloride 5 MG Oral Tablet (Xyzal)    Notes to Pharmacy: **Patient requests 90 days supply**    E-Prescribing Status: Receipt confirmed by pharmacy (10/3/2023  9:59 AM CDT)      Associated Diagnoses    Allergy, sequela        Pharmacy    [facility] #[id number] [place] [address] [address] [facility], [phone], [phone]       metoprolol tartrate 50 MG Oral Tab 180 tablet 3 6/7/2023 --    Sig - Route: Take 1 tablet (50 mg total) by mouth 2 (two) times daily. - Oral    Sent to pharmacy as: Metoprolol Tartrate 50 MG Oral Tablet (Lopressor)    E-Prescribing Status: Receipt confirmed by pharmacy (6/7/2023  3:12 PM CDT)      [facility] #[id number] [place] [address] [address] [facility], [phone], [phone]          Disp Refills Start End    atorvastatin 10 MG Oral Tab 90 tablet 3 6/1/2023 --    Sig - Route: Take 1 tablet (10 mg total) by mouth nightly. - Oral    Sent to pharmacy as: Atorvastatin Calcium 10 MG Oral Tablet (Lipitor)    E-Prescribing Status: Receipt confirmed by pharmacy (6/1/2023  4:27 PM CDT)      Pharmacy    [facility] #[id number] - [address] [facility], [phone], [phone]

## 2024-06-04 RX ORDER — METOPROLOL TARTRATE 50 MG/1
50 TABLET, FILM COATED ORAL 2 TIMES DAILY
Qty: 180 TABLET | Refills: 1 | Status: SHIPPED | OUTPATIENT
Start: 2024-06-04

## 2024-06-04 NOTE — TELEPHONE ENCOUNTER
Please review; protocol failed/No Protocol    Patient states she is out of medication     Requested Prescriptions   Pending Prescriptions Disp Refills    metoprolol tartrate 50 MG Oral Tab 180 tablet 3     Sig: Take 1 tablet (50 mg total) by mouth 2 (two) times daily.       Hypertension Medications Protocol Failed - 6/4/2024  3:10 PM        Failed - Last BP reading less than 140/90     BP Readings from Last 1 Encounters:   11/28/23 158/79               Passed - CMP or BMP in past 12 months        Passed - In person appointment or virtual visit in the past 12 mos or appointment in next 3 mos     Recent Outpatient Visits              9 months ago [facility]    [facility]    9 months ago Encounter for gynecological examination with abnormal finding    [facility]    Office Visit    11 months ago Vaginal bleeding    [facility]    [facility] Visit    1 year ago Pain and swelling of left lower leg    [facility] [facility]    Office Visit    2 years ago HTN (hypertension), benign    [facility]    [facility]                      Passed - EGFRCR or GFRNAA > 50     GFR Evaluation  EGFRCR: 78 , resulted on 6/27/2023               Recent Outpatient Visits              9 months ago [facility]oth infection    [facility]    [facility] Visit    9 months ago Encounter for gynecological examination with abnormal finding    [facility] [place] - OB/Jonny Gold DO    Office Visit    11 months ago Vaginal bleeding    [facility]    [facility] Visit    1  year ago Pain and swelling of left lower leg    [facility] [place] Colleen Levine PA-C    Office Visit    2 years ago HTN (hypertension), benign    [facility] Tita Knight MD    Office Visit

## 2024-06-04 NOTE — TELEPHONE ENCOUNTER
Patient Center Representative  please call and schedule an appointment.  Thank You.  [employer] message sent to the patient.

## 2024-06-04 NOTE — TELEPHONE ENCOUNTER
Patient called stating she is out of medicine and was out of town and needs the medication as soon as possible.

## 2024-06-05 ENCOUNTER — MED REC SCAN ONLY (OUTPATIENT)
Dept: INTERNAL MEDICINE CLINIC | Facility: CLINIC | Age: 64
End: 2024-06-05

## 2024-06-24 DIAGNOSIS — T78.40XS ALLERGY, SEQUELA: ICD-10-CM

## 2024-06-24 DIAGNOSIS — I10 HTN (HYPERTENSION), BENIGN: ICD-10-CM

## 2024-06-27 RX ORDER — LEVOCETIRIZINE DIHYDROCHLORIDE 5 MG/1
5 TABLET, FILM COATED ORAL DAILY PRN
Qty: 90 TABLET | Refills: 3 | Status: SHIPPED | OUTPATIENT
Start: 2024-06-27

## 2024-06-27 RX ORDER — LISINOPRIL AND HYDROCHLOROTHIAZIDE 25; 20 MG/1; MG/1
1 TABLET ORAL DAILY
Qty: 90 TABLET | Refills: 0 | Status: SHIPPED | OUTPATIENT
Start: 2024-06-27

## 2024-06-27 RX ORDER — ATORVASTATIN CALCIUM 10 MG/1
10 TABLET, FILM COATED ORAL NIGHTLY
Qty: 90 TABLET | Refills: 3 | Status: SHIPPED | OUTPATIENT
Start: 2024-06-27

## 2024-06-27 NOTE — TELEPHONE ENCOUNTER
Please review. Protocol Failed; No Protocol    Requested Prescriptions   Pending Prescriptions Disp Refills    lisinopril-hydroCHLOROthiazide 20-25 MG Oral Tab 90 tablet 3     Sig: Take 1 tablet by mouth daily.       Hypertension Medications Protocol Failed - 6/24/2024 11:40 PM        Failed - Last BP reading less than 140/90     BP Readings from Last 1 Encounters:   11/28/23 158/79               Passed - CMP or BMP in past 12 months        Passed - In person appointment or virtual visit in the past 12 mos or appointment in next 3 mos     Recent Outpatient Visits              10 months ago Tooth infection    [facility]    Office Visit    10 months ago Encounter for gynecological examination with abnormal finding    [facility] - OB/GYN Jonny Flores DO    Office Visit    1 year ago Vaginal bleeding    [facility] Tita Knight MD    Office Visit    1 year ago Pain and swelling of left lower leg    [facility] Colleen Levine PA-C    Office Visit    2 years ago HTN (hypertension), benign    [facility] Tita Knight MD    Office Visit                      Passed - EGFRCR or GFRNAA > 50     GFR Evaluation             Signed Prescriptions Disp Refills    atorvastatin 10 MG Oral Tab 90 tablet 3     Sig: Take 1 tablet (10 mg total) by mouth nightly.       Cholesterol Medication Protocol Passed - 6/24/2024 11:40 PM        Passed - ALT < 80     Lab Results   Component Value Date    ALT 35 06/27/2023             Passed - ALT resulted within past year        Passed - Lipid panel within past 12 months     Lab Results   Component Value Date    CHOLEST 153 06/27/2023    TRIG 117 06/27/2023    HDL 42 06/27/2023    LDL 90 06/27/2023    VLDL 19 06/27/2023    TCHDLRATIO 3.9 06/10/2022     NONHDLC 111 06/27/2023             Passed - In person appointment or virtual visit in the past 12 mos or appointment in next 3 mos     Recent Outpatient Visits              10 months ago [facility]    [facility] Visit    10 months ago Encounter for gynecological examination with abnormal finding    [facility] - OB/Jonny Gold DO    Office Visit    1 year ago Vaginal bleeding    [facility]    Office Visit    1 year ago Pain and swelling of left lower leg    [facility] [place]    Office Visit    2 years ago HTN (hypertension), benign    [facility]    [facility] Visit                        levocetirizine 5 MG Oral Tab 90 tablet 3     Sig: Take 1 tablet (5 mg total) by mouth daily as needed for Allergies.       Allergy Medication Protocol Passed - 6/24/2024 11:40 PM        Passed - In person appointment or virtual visit in the past 12 mos or appointment in next 3 mos     Recent Outpatient Visits              10 months ago Tooth infection    [facility]    Office Visit    10 months ago Encounter for gynecological examination with abnormal finding    [facility]/Jonny Gold DO    Office Visit    1 year ago Vaginal bleeding    [facility]    Office Visit    1 year ago Pain and swelling of left lower leg    [facility]    Office Visit    2 years ago HTN (hypertension), benign    [facility]    Office Visit                                Recent Outpatient Visits              10 months ago [facility]oth infection    [facility]    Office Visit    10 months ago Encounter for gynecological examination with abnormal finding    [facility] Jonny Flores DO    Office Visit    1 year ago Vaginal bleeding    [facility]    Office Visit    1 year ago Pain and swelling of left lower leg    [facility] Colleen Levine PA-C    Office Visit    2 years ago HTN (hypertension), benign    [facility] [place]    Office Visit

## 2024-06-27 NOTE — TELEPHONE ENCOUNTER
[employer] message sent to patient to schedule an office visit with PCP.   Please make a phone attempt.

## 2024-07-05 ENCOUNTER — OFFICE VISIT (OUTPATIENT)
Dept: INTERNAL MEDICINE CLINIC | Facility: CLINIC | Age: 64
End: 2024-07-05

## 2024-07-05 ENCOUNTER — LAB ENCOUNTER (OUTPATIENT)
Dept: LAB | Age: 64
End: 2024-07-05
Payer: COMMERCIAL

## 2024-07-05 VITALS
DIASTOLIC BLOOD PRESSURE: 86 MMHG | HEIGHT: 67 IN | OXYGEN SATURATION: 97 % | SYSTOLIC BLOOD PRESSURE: 133 MMHG | WEIGHT: 245 LBS | HEART RATE: 70 BPM | BODY MASS INDEX: 38.45 KG/M2

## 2024-07-05 DIAGNOSIS — Z00.00 ROUTINE GENERAL MEDICAL EXAMINATION AT A HEALTH CARE FACILITY: Primary | ICD-10-CM

## 2024-07-05 DIAGNOSIS — Z12.11 COLON CANCER SCREENING: ICD-10-CM

## 2024-07-05 DIAGNOSIS — Z12.31 ENCOUNTER FOR SCREENING MAMMOGRAM FOR MALIGNANT NEOPLASM OF BREAST: ICD-10-CM

## 2024-07-05 DIAGNOSIS — E11.9 TYPE 2 DIABETES MELLITUS WITHOUT COMPLICATION, WITHOUT LONG-TERM CURRENT USE OF INSULIN (HCC): ICD-10-CM

## 2024-07-05 LAB
CHOLEST SERPL-MCNC: 175 MG/DL (ref ?–200)
DEPRECATED RDW RBC AUTO: 39.8 FL (ref 35.1–46.3)
ERYTHROCYTE [DISTWIDTH] IN BLOOD BY AUTOMATED COUNT: 12.7 % (ref 11–15)
FASTING PATIENT LIPID ANSWER: YES
HCT VFR BLD AUTO: 44 %
HDLC SERPL-MCNC: 41 MG/DL (ref 40–59)
HGB BLD-MCNC: 15.1 G/DL
LDLC SERPL CALC-MCNC: 108 MG/DL (ref ?–100)
MCH RBC QN AUTO: 29.5 PG (ref 26–34)
MCHC RBC AUTO-ENTMCNC: 34.3 G/DL (ref 31–37)
MCV RBC AUTO: 85.9 FL
NONHDLC SERPL-MCNC: 134 MG/DL (ref ?–130)
PLATELET # BLD AUTO: 285 10(3)UL (ref 150–450)
RBC # BLD AUTO: 5.12 X10(6)UL
TRIGL SERPL-MCNC: 144 MG/DL (ref 30–149)
VLDLC SERPL CALC-MCNC: 25 MG/DL (ref 0–30)
WBC # BLD AUTO: 6 X10(3) UL (ref 4–11)

## 2024-07-05 PROCEDURE — 36415 COLL VENOUS BLD VENIPUNCTURE: CPT

## 2024-07-05 PROCEDURE — 80061 LIPID PANEL: CPT

## 2024-07-05 PROCEDURE — 99396 PREV VISIT EST AGE 40-64: CPT

## 2024-07-05 PROCEDURE — 85027 COMPLETE CBC AUTOMATED: CPT

## 2024-07-05 NOTE — PROGRESS NOTES
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 Has vaginal bleeding once in a while and foul-smelling vaginal discharge, but states no change from previous and sees Dr. Flores for this. An endometrial biopsy was done last year along with her pap smear. No mention in Dr. Flores's note about repeating a pap smear earlier than guidelines.   Mammogram - ordered  Colon cancer screen - GI colon screening     HISTORY 8/30/2023  C/o has tooth pain x one week - had pus coming out and her dentist wont take medicaide but needs abx for her inf   Thinks her salivary gland is swollen as well          HISTORY    not working      NEW PT   C/C urgent visit   C/o vaginal bleeding  X one yr -once and then reoccurred march and it was a little more and the nit stopped until one week ago --some days spots other days then when she wipes she sees red --  positive its from the vagina   Worse with constipation     cramping x one week   postmenapausal   menapause 5 yrs ago or so   Also has a BRBPR when constipated and with hard stools and staining       not working      PMH  HL  HTN  dm 2 6.5 on 10/2020  H/o preeclampsia   Left leg superficial thrombus 11/2022 - on asa      Lives with  and 3 kids - 24 son 21 daughter and 17 yo in HS 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     History/Other:    Chief Complaint Reviewed and Verified  No Further Nursing Notes to   Review  Tobacco Reviewed  Allergies Reviewed  Medications Reviewed    Problem List Reviewed  Medical History Reviewed  Surgical History   Reviewed  OB Status Reviewed  Family History Reviewed         Tobacco:  She has never smoked tobacco.    Current Outpatient Medications   Medication Sig Dispense Refill    atorvastatin 10 MG Oral Tab Take 1 tablet (10 mg total) by mouth nightly. 90 tablet 3    levocetirizine 5 MG Oral Tab Take 1 tablet (5 mg total) by mouth daily as needed for Allergies. 90 tablet 3    lisinopril-hydroCHLOROthiazide 20-25 MG Oral Tab Take 1 tablet by mouth daily. 90 tablet 0    metoprolol tartrate 50 MG Oral Tab Take 1 tablet (50 mg total) by mouth 2 (two) times daily. 180 tablet 1    BABY ASPIRIN OR Take 1 tablet by mouth daily.      albuterol 108 (90 Base) MCG/ACT Inhalation Aero Soln Inhale 2 puffs into the lungs every 6 (six) hours as needed for Wheezing or Shortness of Breath. 8.5 g 0     Review of Systems:  Review of Systems  10 point review of systems otherwise negative with the exception of HPI and assessment and plan    Objective:   /86   Pulse 70   Ht 5' 7\" (1.702 m)   Wt 245 lb (111.1 kg)   SpO2 97%   BMI 38.37 kg/m²  Estimated body mass index is 38.37 kg/m² as calculated from the following:    Height as of this encounter: 5' 7\" (1.702 m).    Weight as of this encounter: 245 lb (111.1 kg).  Physical Exam  Vitals reviewed.    Constitutional:       General: She is not in acute distress.     Appearance: Normal appearance. She is well-developed.   HENT:      Right Ear: Tympanic membrane normal.      Left Ear: Tympanic membrane normal.      Mouth/Throat:      Mouth: Mucous membranes are moist.      Pharynx: Oropharynx is clear.   Cardiovascular:      Rate and Rhythm: Normal rate and regular rhythm.      Heart sounds: Normal heart sounds.   Pulmonary:      Effort: Pulmonary effort is normal.      Breath sounds: Normal breath sounds.   Abdominal:      General: Bowel sounds are normal.      Palpations: Abdomen is soft.   Lymphadenopathy:      Cervical: No cervical adenopathy.   Skin:     General: Skin is warm and dry.   Neurological:      Mental Status: She is alert and oriented to person, place, and time.     Assessment & Plan:   1. Routine general medical examination at a health care facility (Primary)  -     CBC, Platelet; No Differential  -     Comp Metabolic Panel (14)  -     Lipid Panel  2. Encounter for screening mammogram for malignant neoplasm of breast  -     [facility]A 2D+3D SCREENING BILAT (CPT=77067/95753); Future; Expected date: 07/05/2024  3. Type 2 diabetes mellitus without complication, without long-term current use of insulin (HCC)  -     Hemoglobin A1C  -     Microalb/Creat Ratio, Random Urine  -     Ophthalmology Referral - In Network  Last A1c value was 6.1% done 6/27/2023.  Due for eye exam - ordered referral   4. Colon cancer screening  -     [facility] GI Telephone Colon Screen; Future; Expected date: 07/12/2024      TERE De Anda, 7/5/2024, 8:43 AM

## 2024-07-05 NOTE — PATIENT INSTRUCTIONS
Fiber supplement - Metamucil or Fiber One   Decrease simple carbs and sugars in the diet   Try to incorporate exercise 2-3 times a week

## 2024-08-28 RX ORDER — METOPROLOL TARTRATE 50 MG
50 TABLET ORAL 2 TIMES DAILY
Qty: 180 TABLET | Refills: 1 | OUTPATIENT
Start: 2024-08-28

## 2024-08-28 NOTE — TELEPHONE ENCOUNTER
Order  metoprolol tartrate 50 MG Oral Tab [[id number]] (Order [id number])  Patient Information    Patient Name  Dora Pressley MRN  [id number] Legal Sex  Female   3/19/1960       Authorizing Provider Encounter Provider   Tita Paul MD Abraham, Moni, MD     Medication Detail    Medication Quantity Refills Start End   metoprolol tartrate 50 MG Oral Tab 180 tablet 1 2024 --   Sig:   Take 1 tablet (50 mg total) by mouth 2 (two) times daily.     Route:   Oral     Order #:   [id number]         Outpatient Medication Detail     Disp Refills Start End    metoprolol tartrate 50 MG Oral Tab 180 tablet 1 2024 --    Sig - Route: Take 1 tablet (50 mg total) by mouth 2 (two) times daily. - Oral    Sent to pharmacy as: Metoprolol Tartrate 50 MG Oral Tablet (Lopressor)    E-Prescribing Status: Receipt confirmed by pharmacy (2024  3:27 PM CDT)      Pharmacy    [facility] [address] [place] [address] [phone], [phone]

## 2024-09-15 DIAGNOSIS — I10 HTN (HYPERTENSION), BENIGN: ICD-10-CM

## 2024-09-15 DIAGNOSIS — T78.40XS ALLERGY, SEQUELA: ICD-10-CM

## 2024-09-19 RX ORDER — LEVOCETIRIZINE DIHYDROCHLORIDE 5 MG/1
5 TABLET, FILM COATED ORAL DAILY PRN
Qty: 90 TABLET | Refills: 3 | OUTPATIENT
Start: 2024-09-19

## 2024-09-19 RX ORDER — LISINOPRIL AND HYDROCHLOROTHIAZIDE 20; 25 MG/1; MG/1
1 TABLET ORAL DAILY
Qty: 90 TABLET | Refills: 0 | OUTPATIENT
Start: 2024-09-19

## 2024-09-19 RX ORDER — ATORVASTATIN CALCIUM 10 MG/1
10 TABLET, FILM COATED ORAL NIGHTLY
Qty: 90 TABLET | Refills: 3 | OUTPATIENT
Start: 2024-09-19

## 2024-09-20 DIAGNOSIS — I10 HTN (HYPERTENSION), BENIGN: ICD-10-CM

## 2024-09-23 ENCOUNTER — MED REC SCAN ONLY (OUTPATIENT)
Dept: INTERNAL MEDICINE CLINIC | Facility: CLINIC | Age: 64
End: 2024-09-23

## 2024-09-24 RX ORDER — LISINOPRIL AND HYDROCHLOROTHIAZIDE 20; 25 MG/1; MG/1
1 TABLET ORAL DAILY
Qty: 90 TABLET | Refills: 3 | Status: SHIPPED | OUTPATIENT
Start: 2024-09-24

## 2024-09-24 NOTE — TELEPHONE ENCOUNTER
[facility] pharmacy calling. Patient is almost out of medication.

## 2024-09-24 NOTE — TELEPHONE ENCOUNTER
Please review; protocol failed/ has no protocol      Erlinda Santos RN12 minutes ago (12:31 PM)       [facility] pharmacy calling. Patient is almost out of medication.        Requested Prescriptions   Pending Prescriptions Disp Refills    LISINOPRIL-HYDROCHLOROTHIAZIDE 20-25 MG Oral Tab [Pharmacy Med Name: Lisinopril-hydroCHLOROthiazide 20-25 MG Oral Tablet] 90 tablet 0     Sig: Take 1 tablet by mouth once daily       Hypertension Medications Protocol Failed - 9/24/2024 12:31 PM        Failed - CMP or BMP in past 12 months        Failed - EGFRCR or GFRNAA > 50     GFR Evaluation            Passed - Last BP reading less than 140/90     BP Readings from Last 1 Encounters:   07/05/24 133/86               Passed - In person appointment or virtual visit in the past 12 mos or appointment in next 3 mos     Recent Outpatient Visits              2 months ago Routine general medical examination at a health care facility    [facility] [facility]    Office Visit    1 year ago [facility]    Office Visit    1 year ago Encounter for gynecological examination with abnormal finding    [facility] - [facility]GY[facility] Jonny Flores DO    Office Visit    1 year ago Vaginal bleeding    [facility]    Office Visit    1 year ago Pain and swelling of left lower leg    [facility] Colleen Levine PA-C    Office Visit                         Recent Outpatient Visits              2 months ago Routine general medical examination at a health care facility    [facility] [place]    Office Visit    1 year ago Tooth infection    [facility]    Office  Visit    1 year ago Encounter for gynecological examination with abnormal finding    [facility] Jonny Flores DO    Office Visit    1 year ago Vaginal bleeding    [facility] [place] Tita Paul MD    Office Visit    1 year ago Pain and swelling of left lower leg    [facility] Colleen Levine PA-C    Office Visit

## 2024-10-21 ENCOUNTER — TELEPHONE (OUTPATIENT)
Dept: INTERNAL MEDICINE CLINIC | Facility: CLINIC | Age: 64
End: 2024-10-21

## 2024-10-21 NOTE — TELEPHONE ENCOUNTER
Dr Paul, please advise    Patient is asking to see PCP only for an appt. Can we use a RES24?  Patient prefers to be seen in the afternoon if possible because she works 2 jobs.  Offered an appt with another provider but patient declines. Patient states symptoms are resolving now but patient wanted to discuss with PCP either this week or next.       Patient (name and  verified) calling with symptoms that happened over the weekend but are now resolving. Patient c/o lower abd pain on Friday. Patient took pepto bismal and pain was better. Also had decrease appetite over the weekend but denies nausea. Patient had mucous stools over the weekend but that is starting to resolve. Patient also states she had increase in flatulence. Patient also states that she had decrease in bowel movements over the weekend. Denies any fever, vomiting or diarrhea.     Patient was instructed that if symptoms worsen to be seen in the ER/IC for evaluation. Verbalized understanding.

## 2024-10-31 ENCOUNTER — LAB ENCOUNTER (OUTPATIENT)
Dept: LAB | Age: 64
End: 2024-10-31
Attending: INTERNAL MEDICINE
Payer: COMMERCIAL

## 2024-10-31 ENCOUNTER — OFFICE VISIT (OUTPATIENT)
Dept: INTERNAL MEDICINE CLINIC | Facility: CLINIC | Age: 64
End: 2024-10-31
Payer: COMMERCIAL

## 2024-10-31 VITALS
HEIGHT: 67 IN | BODY MASS INDEX: 37.67 KG/M2 | DIASTOLIC BLOOD PRESSURE: 85 MMHG | SYSTOLIC BLOOD PRESSURE: 135 MMHG | WEIGHT: 240 LBS | HEART RATE: 64 BPM

## 2024-10-31 DIAGNOSIS — E11.9 TYPE 2 DIABETES MELLITUS WITHOUT COMPLICATION, WITHOUT LONG-TERM CURRENT USE OF INSULIN (HCC): ICD-10-CM

## 2024-10-31 DIAGNOSIS — Z12.31 SCREENING MAMMOGRAM, ENCOUNTER FOR: ICD-10-CM

## 2024-10-31 DIAGNOSIS — Z12.83 SKIN CANCER SCREENING: ICD-10-CM

## 2024-10-31 DIAGNOSIS — Z00.00 PHYSICAL EXAM, ANNUAL: Primary | ICD-10-CM

## 2024-10-31 DIAGNOSIS — I10 HTN (HYPERTENSION), BENIGN: ICD-10-CM

## 2024-10-31 DIAGNOSIS — Z12.11 COLON CANCER SCREENING: ICD-10-CM

## 2024-10-31 DIAGNOSIS — N95.0 POSTMENOPAUSAL BLEEDING: ICD-10-CM

## 2024-10-31 LAB
ALBUMIN SERPL-MCNC: 4.5 G/DL (ref 3.2–4.8)
ALBUMIN/GLOB SERPL: 1.4 {RATIO} (ref 1–2)
ALP LIVER SERPL-CCNC: 112 U/L
ALT SERPL-CCNC: 34 U/L
ANION GAP SERPL CALC-SCNC: 6 MMOL/L (ref 0–18)
AST SERPL-CCNC: 26 U/L (ref ?–34)
BILIRUB SERPL-MCNC: 0.8 MG/DL (ref 0.2–1.1)
BUN BLD-MCNC: 21 MG/DL (ref 9–23)
BUN/CREAT SERPL: 22.8 (ref 10–20)
CALCIUM BLD-MCNC: 9.8 MG/DL (ref 8.7–10.4)
CHLORIDE SERPL-SCNC: 106 MMOL/L (ref 98–112)
CO2 SERPL-SCNC: 30 MMOL/L (ref 21–32)
CREAT BLD-MCNC: 0.92 MG/DL
CREAT UR-SCNC: 94 MG/DL
EGFRCR SERPLBLD CKD-EPI 2021: 70 ML/MIN/1.73M2 (ref 60–?)
EST. AVERAGE GLUCOSE BLD GHB EST-MCNC: 137 MG/DL (ref 68–126)
FASTING STATUS PATIENT QL REPORTED: NO
GLOBULIN PLAS-MCNC: 3.2 G/DL (ref 2–3.5)
GLUCOSE BLD-MCNC: 102 MG/DL (ref 70–99)
HBA1C MFR BLD: 6.4 % (ref ?–5.7)
MICROALBUMIN UR-MCNC: 0.4 MG/DL
MICROALBUMIN/CREAT 24H UR-RTO: 4.3 UG/MG (ref ?–30)
OSMOLALITY SERPL CALC.SUM OF ELEC: 297 MOSM/KG (ref 275–295)
POTASSIUM SERPL-SCNC: 4 MMOL/L (ref 3.5–5.1)
PROT SERPL-MCNC: 7.7 G/DL (ref 5.7–8.2)
SODIUM SERPL-SCNC: 142 MMOL/L (ref 136–145)
TSI SER-ACNC: 1.32 MIU/ML (ref 0.55–4.78)

## 2024-10-31 PROCEDURE — 82043 UR ALBUMIN QUANTITATIVE: CPT

## 2024-10-31 PROCEDURE — 83036 HEMOGLOBIN GLYCOSYLATED A1C: CPT

## 2024-10-31 PROCEDURE — 82570 ASSAY OF URINE CREATININE: CPT

## 2024-10-31 PROCEDURE — 84443 ASSAY THYROID STIM HORMONE: CPT

## 2024-10-31 PROCEDURE — 80053 COMPREHEN METABOLIC PANEL: CPT

## 2024-10-31 PROCEDURE — 99213 OFFICE O/P EST LOW 20 MIN: CPT | Performed by: INTERNAL MEDICINE

## 2024-10-31 PROCEDURE — 36415 COLL VENOUS BLD VENIPUNCTURE: CPT

## 2024-10-31 NOTE — PROGRESS NOTES
Dora Pressley is a 64 year old female.  Chief Complaint   Patient presents with    Abdominal Pain     2 weeks ago - pt reports eating greasy food noticed pain lower abd pain bowel movent was watert w/mucus - states now is feeling fine but would like to discuss pain        HPI:   Urgent visit   C/c annual physical  C/o initially made this visit for lower abd pain  2 weeks ago started after eating Guyanese food , so not sure if its after eating that , some nausea , no vomiting and after the weekend got back to normal for for the past 2 days was just mucous in the stool   Now now normal BM since then x 3             HISTORY    not working      NEW PT   C/C urgent visit   C/o vaginal bleeding  X one yr -once and then reoccurred march and it was a little more and the nit stopped until one week ago --some days spots other days then when she wipes she sees red -- positive its from the vagina   Worse with constipation     cramping x one week   postmenapausal   menapause 5 yrs ago or so   Also has a BRBPR when constipated and with hard stools and staining       not working         PMH  HL  HTN  dm 2 6.5 on 10/2020  H/o preeclampsia   Left leg superficial thrombus 11/2022 - on asa      Lives with  and 3 kids - 24 son 21 daughter and 17 yo in [facility]   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 90 tablet 3    atorvastatin 10 MG Oral Tab Take 1 tablet (10 mg total) by mouth nightly. 90 tablet 3    levocetirizine 5 MG Oral Tab Take 1 tablet (5 mg total) by mouth daily as needed for Allergies. 90 tablet 3    metoprolol tartrate 50 MG Oral Tab Take 1 tablet (50 mg total) by mouth 2 (two) times daily. 180 tablet 1    BABY ASPIRIN OR Take 1 tablet by mouth daily.      albuterol 108 (90 Base) MCG/ACT Inhalation Aero Soln Inhale 2 puffs into the lungs every 6 (six)  hours as needed for Wheezing or Shortness of Breath. 8.5 g 0      Past Medical History:    Arthritis    Check mychart...left knee    Borderline diabetes    Essential hypertension    Hyperlipidemia    Started atrovastatin...controlled    Obesity    Working on it...      Past Surgical History:   Procedure Laterality Date    Adenoidectomy  1967    Removed with tonsils            1990, 10/1996, 1999    Scheduled induction     Other surgical history      neck surg bn cyst in the 90s like 1993    Tonsillectomy   (?)    I was 7?      Social History:  Social History     Socioeconomic History    Marital status:    Tobacco Use    Smoking status: Never    Smokeless tobacco: Never    Tobacco comments:     2nd hand smoke as a child of smoker   Vaping Use    Vaping status: Never Used   Substance and Sexual Activity    Alcohol use: Yes     Alcohol/week: 1.0 standard drink of alcohol     Types: 1 Glasses of wine per week    Drug use: No    Sexual activity: Not Currently        REVIEW OF SYSTEMS:   GENERAL HEALTH: + fevers, chills, + sweats, fatigue but drinks a lot of caffeine   VISION: No recent vision problems, blurry vision or double vision  HEENT: No decreased hearing ear pain nasal congestion or sore throat  SKIN: denies any unusual skin lesions or rashes  RESPIRATORY: denies shortness of breath, cough, wheezing  CARDIOVASCULAR: denies chest pain on exertion, palpitations, swelling in feet  GI: denies abdominal pain and denies heartburn, nausea or vomiting  : No Pain on urination, change in the color of urine, discharge, urinating frequently  MUS: No back pain, joint pain, muscle pain  NEURO: denies headaches , anxiety, depression    EXAM:   /85   Pulse 64   Ht 5' 7\" (1.702 m)   Wt 240 lb (108.9 kg)   BMI 37.59 kg/m²   GENERAL: well developed, well nourished,in no apparent distress  SKIN: no rashes,no suspicious lesions  HEENT: atraumatic, normocephalic  NECK: supple,no  adenopathy  LUNGS: clear to auscultation, no wheeze  CARDIO: RRR without murmur  GI: good BS's,no masses or tenderness  EXTREMITIES: no cyanosis, or edema  Bilateral barefoot skin diabetic exam is normal, visualized feet and the appearance is normal.  Bilateral monofilament/sensation of both feet is normal.  Pulsation pedal pulse exam of both lower legs/feet is normal as well.      BREAST: Bilateral breasts without any lumps, bilateral axilla without any lymphadenopathy, no nipple retraction or  discharge      ASSESSMENT AND PLAN:   Diagnoses and all orders for this visit:    Physical exam, annual  Advised patient to watch what she eats exercise, seatbelt use no texting driving, sunscreen use advised  Colon cancer screening  -     [facility] Telephone Colon Screen  -     GASTRO - INTERNAL  Refer  HTN (hypertension), benign  Advised pt to follow a low salt , low sodium (including fast foods and processed foods), can look up DASH diet, exercise 30 min a day , monitor bp     Screening mammogram, encounter for  -     [facility] MAYA 2D+3D SCREENING BILAT (CPT=77067/88619); Future  Ordered  Postmenopausal bleeding  -     OBG Referral - In Network  Refer  Skin cancer screening  -     DERM - INTERNAL  Refer  Type 2 diabetes mellitus without complication, without long-term current use of insulin (HCC)  -     Cancel: Comp Metabolic Panel (14)  -     Cancel: Lipid Panel  -     TSH W Reflex To Free T4  -     Cancel: Hemoglobin A1C  -     Cancel: Microalb/Creat Ratio, Random Urine  -     Cancel: CBC, Platelet; No Differential  -     Ophthalmology Referral - In Network  Hba1c 6.5 on 10/2020 and 6.1 on 6/2023   [facility]  6/2023   Eye exam will refer   On Asa ,on  acei, statin   Foot 6/27/2023  Diet -- advised to follow a low carb, low sugar diet , try to be consistent                Exercise 30 min a day        Preventative medicine   Mammogram 1/2024   Pap - 8/2023 dr manley   Colonoscopy -refer   Labs 6/20237/2024     The patient  indicates understanding of these issues and agrees to the plan.  No follow-ups on file.

## 2024-11-29 RX ORDER — METOPROLOL TARTRATE 50 MG
50 TABLET ORAL 2 TIMES DAILY
Qty: 180 TABLET | Refills: 1 | Status: SHIPPED | OUTPATIENT
Start: 2024-11-29

## 2024-12-04 ENCOUNTER — OFFICE VISIT (OUTPATIENT)
Dept: INTERNAL MEDICINE CLINIC | Facility: CLINIC | Age: 64
End: 2024-12-04

## 2024-12-04 ENCOUNTER — NURSE TRIAGE (OUTPATIENT)
Dept: INTERNAL MEDICINE CLINIC | Facility: CLINIC | Age: 64
End: 2024-12-04

## 2024-12-04 VITALS
SYSTOLIC BLOOD PRESSURE: 105 MMHG | BODY MASS INDEX: 37.1 KG/M2 | OXYGEN SATURATION: 97 % | HEIGHT: 67 IN | WEIGHT: 236.38 LBS | DIASTOLIC BLOOD PRESSURE: 71 MMHG | HEART RATE: 67 BPM

## 2024-12-04 DIAGNOSIS — A09 INFECTIOUS COLITIS: Primary | ICD-10-CM

## 2024-12-04 PROCEDURE — 99213 OFFICE O/P EST LOW 20 MIN: CPT | Performed by: STUDENT IN AN ORGANIZED HEALTH CARE EDUCATION/TRAINING PROGRAM

## 2024-12-04 NOTE — TELEPHONE ENCOUNTER
Please reply to pool: EM RN TRIAGE  Action Requested: Summary for Provider     []  Critical Lab, Recommendations Needed  [] Need Additional Advice  [x]   FYI    []   Need Orders  [] Need Medications Sent to Pharmacy  []  Other     SUMMARY: Patient contacts clinic reporting diarrhea x 3 days.  Very watery.  2-3 bowel movement per day.  Denies abdominal pain, nausea or vomiting.  No fever.  Taking antidiarrheal without relief.  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  Acute visit booked today.     Reason for call: Diarrhea  Onset: Data Unavailable                       Reason for Disposition   MILD diarrhea (e.g., 1-3 or more stools than normal in past 24 hours) diarrhea without known cause and present > 7 days    Protocols used: Diarrhea-A-OH

## 2024-12-04 NOTE — PROGRESS NOTES
Subjective     Chief Complaint   Patient presents with    Diarrhea     For about 3 days, need note for work, been taking over the counter meds Loperamide hydrochloride 2 mg doesn't seem to help. Notice color change in stool was yellow.        Dora Pressley is a 64 year old female who is presenting today with diarrhea.     Started  night. She is having about 4-5 bowel movements a day, loose to watery. She hasn't been able to go to work since. 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 No fever. No N/V, or abdominal pain. Feeling well otherwise. Needs a letter for work.     She did have popeyes that day but does not recall any other foods out of the ordinary.     Patient denies chest pain, SOB, N/V, hematuria, BRBPR, mood disturbances.    Past Medical History:    Arthritis    Check mychart...left knee    Borderline diabetes    Essential hypertension    Hyperlipidemia    Started atrovastatin...controlled    Obesity    Working on it...       Past Surgical History:   Procedure Laterality Date    Adenoidectomy      Removed with tonsils            1990, 10/1996, 1999    Scheduled induction     Other surgical history      neck surg bn cyst in the  like     Tonsillectomy   (?)    I was 7?       Allergies[1]    Family History   Problem Relation Age of Onset    Breast Cancer Mother 90    Cancer Mother         Non squama cell skin cancer    Hypertension Mother         Decades    Hypertension Sister     Hypertension Daughter     Hypertension Son     Hypertension Son     Ovarian Cancer Neg        Social History     Socioeconomic History    Marital status:    Tobacco Use    Smoking status: Never    Smokeless tobacco: Never    Tobacco comments:     2nd hand smoke as a child of smoker   Vaping Use    Vaping status: Never Used   Substance and Sexual Activity    Alcohol use: Yes     Alcohol/week: 1.0 standard drink of alcohol     Types: 1 Glasses of wine per week    Drug use: No    Sexual  activity: Not Currently         I have personally reviewed and updated the following EMR sections as appropriate: Current medications, Allergies, Problem list, Past Medical History, Past Surgical History, Social History and Family History    Review of Systems   Constitutional: Negative.    Respiratory: Negative.     Cardiovascular: Negative.    Gastrointestinal:  Positive for diarrhea. Negative for abdominal pain, blood in stool, nausea and vomiting.   Skin: Negative.    Neurological: Negative.        Objective     Medications Ordered Prior to Encounter[2]  /71 (BP Location: Right arm, Patient Position: Sitting, Cuff Size: large)   Pulse 67   Ht 5' 7\" (1.702 m)   Wt 236 lb 6.4 oz (107.2 kg)   SpO2 97%   BMI 37.03 kg/m²   Physical Exam  Vitals reviewed.   Constitutional:       Appearance: Normal appearance.   HENT:      Head: Normocephalic and atraumatic.   Abdominal:      General: Abdomen is flat. There is no distension.      Palpations: Abdomen is soft.      Tenderness: There is no abdominal tenderness.   Skin:     General: Skin is warm and dry.   Neurological:      General: No focal deficit present.      Mental Status: She is alert and oriented to person, place, and time.   Psychiatric:         Mood and Affect: Mood normal.         Behavior: Behavior normal.         Recent Results (from the past 14 weeks)   Hemoglobin A1C [E]    Collection Time: 10/31/24  2:13 PM   Result Value Ref Range    HgbA1C 6.4 (H) <5.7 %    Estimated Average Glucose 137 (H) 68 - 126 mg/dL   MICROALB/CREAT RATIO, RANDOM URINE [6517] [Q]    Collection Time: 10/31/24  2:13 PM   Result Value Ref Range    Microalbumin, Urine 0.40 mg/dL    Creatinine Ur Random 94.00 mg/dL    Malb/Cre Calc 4.3 <=30.0 ug/mg   Comp Metabolic Panel (14) [E]    Collection Time: 10/31/24  2:13 PM   Result Value Ref Range    Glucose 102 (H) 70 - 99 mg/dL    Sodium 142 136 - 145 mmol/L    Potassium 4.0 3.5 - 5.1 mmol/L    Chloride 106 98 - 112 mmol/L    CO2  30.0 21.0 - 32.0 mmol/L    Anion Gap 6 0 - 18 mmol/L    BUN 21 9 - 23 mg/dL    Creatinine 0.92 0.55 - 1.02 mg/dL    BUN/CREA Ratio 22.8 (H) 10.0 - 20.0    Calcium, Total 9.8 8.7 - 10.4 mg/dL    Calculated Osmolality 297 (H) 275 - 295 mOsm/kg    eGFR-Cr 70 >=60 mL/min/1.73m2    ALT 34 10 - 49 U/L    AST 26 <34 U/L    Alkaline Phosphatase 112 50 - 130 U/L    Bilirubin, Total 0.8 0.2 - 1.1 mg/dL    Total Protein 7.7 5.7 - 8.2 g/dL    Albumin 4.5 3.2 - 4.8 g/dL    Globulin  3.2 2.0 - 3.5 g/dL    A/G Ratio 1.4 1.0 - 2.0    Patient Fasting for CMP? No    TSH W Reflex To Free T4    Collection Time: 10/31/24  2:13 PM   Result Value Ref Range    TSH 1.319 0.550 - 4.780 mIU/mL       Assessment and Plan      Infectious colitis (Primary)  Imodium PRN.   Hydration.   Letter provided. Can go back to work when diarrhea resolved.   Counseled about red flags and when to seek help.        No follow-ups on file.    Marlys Manzano MD  Internal Medicine  12/4/2024       [1] No Known Allergies  [2]   Current Outpatient Medications on File Prior to Visit   Medication Sig Dispense Refill    metoprolol tartrate 50 MG Oral Tab Take 1 tablet by mouth twice daily 180 tablet 1    lisinopril-hydroCHLOROthiazide 20-25 MG Oral Tab Take 1 tablet by mouth daily. 90 tablet 3    atorvastatin 10 MG Oral Tab Take 1 tablet (10 mg total) by mouth nightly. 90 tablet 3    levocetirizine 5 MG Oral Tab Take 1 tablet (5 mg total) by mouth daily as needed for Allergies. 90 tablet 3    BABY ASPIRIN OR Take 1 tablet by mouth daily.      albuterol 108 (90 Base) MCG/ACT Inhalation Aero Soln Inhale 2 puffs into the lungs every 6 (six) hours as needed for Wheezing or Shortness of Breath. 8.5 g 0     No current facility-administered medications on file prior to visit.

## 2024-12-08 ENCOUNTER — TELEPHONE (OUTPATIENT)
Dept: INTERNAL MEDICINE CLINIC | Facility: CLINIC | Age: 64
End: 2024-12-08

## 2024-12-08 DIAGNOSIS — R19.7 DIARRHEA, UNSPECIFIED TYPE: Primary | ICD-10-CM

## 2024-12-08 PROCEDURE — 99213 OFFICE O/P EST LOW 20 MIN: CPT | Performed by: INTERNAL MEDICINE

## 2024-12-08 NOTE — TELEPHONE ENCOUNTER
Patient ID: Dora Pressley is a 64 year old female.  Chief Complaint   Patient presents with    Diarrhea        Virtual/Telephone Check-In    Dora Pressley verbally consents to a Virtual/Telephone Check-In service on 24. Patient understands and accepts financial responsibility for any deductible, co-insurance and/or co-pays associated with this service.  Patient call triage note reviewed.    HISTORY OF PRESENT ILLNESS:   Patient presents for above.  This is a telephone visit.  Having 1 week history of diarrhea.  Multiple episodes daily.  She was feeling better after 3 to 4 days and then began eating her normal foods and symptoms recurred again.  Denies fevers, chills, bloody stools, abdominal pains.  She is wondering about cancer.    MEDICAL HISTORY:     Past Medical History:    Arthritis    Check mychart...left knee    Borderline diabetes    Essential hypertension    Hyperlipidemia    Started atrovastatin...controlled    Obesity    Working on it...       Past Surgical History:   Procedure Laterality Date    Adenoidectomy      Removed with tonsils            1990, 10/1996, 1999    Scheduled induction     Other surgical history      neck surg bn cyst in the  like     Tonsillectomy   (?)    I was 7?         Current Outpatient Medications:     metoprolol tartrate 50 MG Oral Tab, Take 1 tablet by mouth twice daily, Disp: 180 tablet, Rfl: 1    lisinopril-hydroCHLOROthiazide 20-25 MG Oral Tab, Take 1 tablet by mouth daily., Disp: 90 tablet, Rfl: 3    atorvastatin 10 MG Oral Tab, Take 1 tablet (10 mg total) by mouth nightly., Disp: 90 tablet, Rfl: 3    levocetirizine 5 MG Oral Tab, Take 1 tablet (5 mg total) by mouth daily as needed for Allergies., Disp: 90 tablet, Rfl: 3    BABY ASPIRIN OR, Take 1 tablet by mouth daily., Disp: , Rfl:     albuterol 108 (90 Base) MCG/ACT Inhalation Aero Soln, Inhale 2 puffs into the lungs every 6 (six) hours as needed for Wheezing or Shortness of  Breath., Disp: 8.5 g, Rfl: 0    Allergies:Allergies[1]    Social History     Socioeconomic History    Marital status:      Spouse name: Not on file    Number of children: Not on file    Years of education: Not on file    Highest education level: Not on file   Occupational History    Not on file   Tobacco Use    Smoking status: Never    Smokeless tobacco: Never    Tobacco comments:     2nd hand smoke as a child of smoker   Vaping Use    Vaping status: Never Used   Substance and Sexual Activity    Alcohol use: Yes     Alcohol/week: 1.0 standard drink of alcohol     Types: 1 Glasses of wine per week    Drug use: No    Sexual activity: Not Currently   Other Topics Concern    Not on file   Social History Narrative    Not on file     Social Drivers of Health     Financial Resource Strain: Not on file   Food Insecurity: Not on file   Transportation Needs: Not on file   Physical Activity: Not on file   Stress: Not on file   Social Connections: Not on file   Housing Stability: Not on file       PHYSICAL EXAM:   Unable to perform vitals or do physical exam as this is a telephone visit.    ASSESSMENT/PLAN:   1. Diarrhea, unspecified type  Explained that she needs to have a brat diet for 5 to 7 days and then slowly incorporate foods once symptoms are better for several days.  Stay well-hydrated.  She was given referral for colonoscopy recently and should schedule appointment if she has not done so.    Return if symptoms worsen or fail to improve.    Time spent on encounter  14 minutes   Telephone time 9 minutes   Documentation time 5 minutes     Dora Pressley understands phone evaluation is not a substitute for face-to-face examination or emergency care. Patient advised to go to ER or call 911 for worsening symptoms or acute distress.    Please note that the following visit was completed using two-way, real-time interactive audio and/or video communication.  This has been done in good ritchie to provide continuity of care  in the best interest of the provider-patient relationship, due to the ongoing public health crisis/national emergency and because of restrictions of visitation.  There are limitations of this visit as no physical exam could be performed.  Every conscious effort was taken to allow for sufficient and adequate time.  This billing was spent on reviewing labs, medications, radiology tests and decision making.  Appropriate medical decision-making and tests are ordered as detailed in the plan of care above.    This note was prepared using Dragon Medical voice recognition dictation software. As a result errors may occur. When identified these errors have been corrected. While every attempt is made to correct errors during dictation discrepancies may still exist.    Pop Ryan MD  12/8/2024       [1] No Known Allergies

## 2024-12-18 ENCOUNTER — TELEPHONE (OUTPATIENT)
Dept: INTERNAL MEDICINE CLINIC | Facility: CLINIC | Age: 64
End: 2024-12-18

## 2024-12-18 NOTE — TELEPHONE ENCOUNTER
She was evaluated by 2 different providers  I last seen her in oct   Referral for colonoscopy is already placed  Would be easier to see first available GI doctor

## 2024-12-18 NOTE — TELEPHONE ENCOUNTER
Spoke with patient, Date of Birth verified  She was informed of  MD recommendation, she stated understanding.  GI tel # provided.

## 2024-12-18 NOTE — TELEPHONE ENCOUNTER
Patient calling ( name and date of birth of patient verified ) has been having episodes of diarrhea    Last office visit 12/4/2024 in person and 12/8  Televisit     Has bene doing the BRAT diet  but then did begin eating a bit more     Went about 3 days with no BM at all   Has been taking Imodium up 3 -4 times a day   States the diarrhea and very loose stool that is yellow , this  has returned and has been happening for the past few days , no blood in her stool , no nausea or vomiting   ( E.g. 3-4 episodes per day )     Today she had to leave work today due the diarrhea   Very upset about this issue and did mention never had a colonoscopy      Allergies reviewed and pharmacy confirmed      Asking what are the next steps ??    Please advise and thank you.    Please reply to pool: MARIETTA RN TRIAGE

## 2024-12-21 ENCOUNTER — OFFICE VISIT (OUTPATIENT)
Dept: DERMATOLOGY CLINIC | Facility: CLINIC | Age: 64
End: 2024-12-21
Payer: COMMERCIAL

## 2024-12-21 DIAGNOSIS — L56.5 DSAP (DISSEMINATED SUPERFICIAL ACTINIC POROKERATOSIS): ICD-10-CM

## 2024-12-21 DIAGNOSIS — D22.9 MULTIPLE NEVI: ICD-10-CM

## 2024-12-21 DIAGNOSIS — L81.4 LENTIGO: ICD-10-CM

## 2024-12-21 DIAGNOSIS — D23.9 BENIGN NEOPLASM OF SKIN, UNSPECIFIED LOCATION: ICD-10-CM

## 2024-12-21 DIAGNOSIS — L82.1 SK (SEBORRHEIC KERATOSIS): ICD-10-CM

## 2024-12-21 DIAGNOSIS — L57.0 AK (ACTINIC KERATOSIS): Primary | ICD-10-CM

## 2024-12-21 PROCEDURE — 99203 OFFICE O/P NEW LOW 30 MIN: CPT | Performed by: DERMATOLOGY

## 2024-12-21 RX ORDER — IMIQUIMOD 12.5 MG/.25G
CREAM TOPICAL
Qty: 12 EACH | Refills: 1 | Status: SHIPPED | OUTPATIENT
Start: 2024-12-21

## 2024-12-21 NOTE — PROGRESS NOTES
Dora Pressley is a 64 year old female.    CC:    Chief Complaint   Patient presents with    Full Skin Exam     \"New Patient\" presents for FBSE. Has c/o dry spot on nasal bridge. Pt admits to being a \"scab \". Applying facial moisturizers. Denies personal Hx of skin cancer. Family Hx of SCC(Mother).         HISTORY:    Past Medical History:    Arthritis    Check mychart...left knee    Borderline diabetes    Essential hypertension    Hyperlipidemia    Started atrovastatin...controlled    Obesity    Working on it...      Past Surgical History:   Procedure Laterality Date    Adenoidectomy      Removed with tonsils            1990, 10/1996, 1999    Scheduled induction     Other surgical history      neck surg bn cyst in the  like     Tonsillectomy   (?)    I was 7?      Family History   Problem Relation Age of Onset    Breast Cancer Mother 90    Cancer Mother         Non squama cell skin cancer    Hypertension Mother         Decades    Hypertension Sister     Hypertension Daughter     Hypertension Son     Hypertension Son     Ovarian Cancer Neg     Prostate Cancer Neg     Pancreatic Cancer Neg       Social History     Socioeconomic History    Marital status:    Tobacco Use    Smoking status: Never    Smokeless tobacco: Never    Tobacco comments:     2nd hand smoke as a child of smoker   Vaping Use    Vaping status: Never Used   Substance and Sexual Activity    Alcohol use: Yes     Alcohol/week: 1.0 standard drink of alcohol     Types: 1 Glasses of wine per week    Drug use: No    Sexual activity: Not Currently   Other Topics Concern    History of tanning Yes    Reaction to local anesthetic No    Pt has a pacemaker No    Pt has a defibrillator No        Current Outpatient Medications   Medication Sig Dispense Refill    Imiquimod 5 % External Cream Apply topically 2 times every week to nose 12 each 1    metoprolol tartrate 50 MG Oral Tab Take 1 tablet by mouth twice daily  180 tablet 1    lisinopril-hydroCHLOROthiazide 20-25 MG Oral Tab Take 1 tablet by mouth daily. 90 tablet 3    atorvastatin 10 MG Oral Tab Take 1 tablet (10 mg total) by mouth nightly. 90 tablet 3    levocetirizine 5 MG Oral Tab Take 1 tablet (5 mg total) by mouth daily as needed for Allergies. 90 tablet 3    BABY ASPIRIN OR Take 1 tablet by mouth daily.      albuterol 108 (90 Base) MCG/ACT Inhalation Aero Soln Inhale 2 puffs into the lungs every 6 (six) hours as needed for Wheezing or Shortness of Breath. 8.5 g 0     Allergies:   Allergies[1]    Past Medical History:    Arthritis    Check mychart...left knee    Borderline diabetes    Essential hypertension    Hyperlipidemia    Started atrovastatin...controlled    Obesity    Working on it...     Past Surgical History:   Procedure Laterality Date    Adenoidectomy      Removed with tonsils            1990, 10/1996, 1999    Scheduled induction     Other surgical history      neck surg bn cyst in the  like     Tonsillectomy   (?)    I was 7?     Social History     Socioeconomic History    Marital status:      Spouse name: Not on file    Number of children: Not on file    Years of education: Not on file    Highest education level: Not on file   Occupational History    Not on file   Tobacco Use    Smoking status: Never    Smokeless tobacco: Never    Tobacco comments:     2nd hand smoke as a child of smoker   Vaping Use    Vaping status: Never Used   Substance and Sexual Activity    Alcohol use: Yes     Alcohol/week: 1.0 standard drink of alcohol     Types: 1 Glasses of wine per week    Drug use: No    Sexual activity: Not Currently   Other Topics Concern    Grew up on a farm Not Asked    History of tanning Yes    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    Breast feeding Not Asked    Reaction to local anesthetic No    Pt has a pacemaker No    Pt has a defibrillator No   Social History Narrative    Not on file     Social Drivers of Health      Financial Resource Strain: Not on file   Food Insecurity: Not on file   Transportation Needs: Not on file   Physical Activity: Not on file   Stress: Not on file   Social Connections: Not on file   Housing Stability: Not on file     Family History   Problem Relation Age of Onset    Breast Cancer Mother 90    Cancer Mother         Non squama cell skin cancer    Hypertension Mother         Decades    Hypertension Sister     Hypertension Daughter     Hypertension Son     Hypertension Son     Ovarian Cancer Neg     Prostate Cancer Neg     Pancreatic Cancer Neg        HPI:     HPI:  Chief Complaint   Patient presents with    Full Skin Exam     \"New Patient\" presents for FBSE. Has c/o dry spot on nasal bridge. Pt admits to being a \"scab \". Applying facial moisturizers. Denies personal Hx of skin cancer. Family Hx of SCC(Mother).         No personal history of skin cancer,   .+family history of skin cancer  Patient is with multiple lesions friend with melanoma    Patient presents with concerns above.    Patient has been in their usual state of health.     Past notes/ records and appropriate/relevant lab results including pathology and past body maps reviewed. Including outside notes/ PCP notes as appropriate. Updated and new information noted in current visit.     ROS:  Denies other relevant systemic complaints. See HPI.     History, medications, allergies reviewed as noted.    Allergies:  Patient has no known allergies.      There were no vitals filed for this visit.    Physical Examination:     Well-developed well-nourished patient alert oriented in no acute distress.  Exam performed of appropriate involved areas    Multiple light to medium brown, well marginated, uniformly pigmented, macules and papules 6 mm and less scattered on exam. pigmented lesions examined with dermoscopy benign-appearing patterns.     Waxy tannish keratotic papules scattered, cherry-red vascular papules scattered.    See map today's date  for lesions noted .  See assessment and plan below for specific lesions.    Otherwise remarkable for lesions as noted on map.    See A/P  below for additional information:    Assessment / plan:    No orders of the defined types were placed in this encounter.      Meds & Refills for this Visit:  Requested Prescriptions     Signed Prescriptions Disp Refills    Imiquimod 5 % External Cream 12 each 1     Sig: Apply topically 2 times every week to nose         Encounter Diagnoses   Name Primary?    AK (actinic keratosis) Yes    DSAP (disseminated superficial actinic porokeratosis)     SK (seborrheic keratosis)     Multiple nevi     Lentigo     Benign neoplasm of skin, unspecified location        Erythematous scaling papules over the nasal dorsum  Actinic keratoses.  Precancerous nature discussed.  Treatment options reviewed at length we will proceed with topical therapy with   mod     twice weekly for   6   week course  of actual medication use and may alternate weeks if necessary.  Increased redness scaling crusting irritation pain tenderness potential discussed.  Anticipate one month for resolution of symptoms.  Plan recheck in one month after completion of treatment course.  Consider alternative treatment biopsy if not resolved.    DSA P like lesions over the lower extremities, lentigines, observe.    Patient with history of superficial clot in the posterior leg lesion right plantar foot monitor  Macule left plantar foot 3 mm light brown  Benign-appearing nevi, no atypical features on dermoscopy reassurance given monitor.     Waxy tan keratotic papules lesions in areas of concern as noted reassurance given.  Benign nature discussed.  Possibility of cryo, alphahydroxy acids over-the-counter retinol's discussed.     No other susupicious lesions on todays  exam.    Please refer to map for specific lesions.  See additional diagnoses.  Pros cons of various therapies, risks benefits discussed.Pathophysiology, terapeutic  options reviewed.  See  Medications in grid.  Instructions reviewed at length.    Benign nevi, seborrheic  keratoses, cherry angiomas:  Reassurance regarding other benign skin lesions.    Monitor for new or changing lesions. Signs and symptoms of skin cancer, ABCDE's of melanoma ( additional information available at AAD.org, skincancer.org) Encourage Sunscreen (broad-spectrum, ideally mineral-based-UVA/UVB -SPF 30 or higher) use encouraged, sun protection/sun protective clothing, self exams reviewed Followup as noted RTC ---routine checkup 6 mos -one year or p.r.n.    Encounter Times   Including precharting, reviewing chart, prior notes obtaining history: 10 minutes, medical exam :10 minutes, notes on body map, plan, counseling 10minutes My total time spent caring for the patient on the day of the encounter: 30 minutes     The patient indicates understanding of these issues and agrees to the plan.  The patient is asked to return as noted in follow-up/ above.    This note was generated using Dragon voice recognition software.  Please contact me regarding any confusion resulting from errors in recognition..  Note to patient and family: The 21st Century Cures Act makes medical notes like these available to patients. However, be advised this is a medical document. It is intended as [id number] communication and monitoring of a patient's care needs. It is written in medical language and may contain abbreviations or verbiage that are unfamiliar. It may appear blunt or direct. Medical documents are intended to carry relevant information, facts as evident and the clinical opinion of the practitioner.         [1] No Known Allergies

## 2024-12-28 ENCOUNTER — HOSPITAL ENCOUNTER (OUTPATIENT)
Dept: MAMMOGRAPHY | Age: 64
Discharge: HOME | End: 2024-12-28
Attending: INTERNAL MEDICINE
Payer: COMMERCIAL

## 2024-12-28 DIAGNOSIS — Z12.31 SCREENING MAMMOGRAM, ENCOUNTER FOR: ICD-10-CM

## 2024-12-28 PROCEDURE — 77067 SCR MAMMO BI INCL CAD: CPT | Performed by: INTERNAL MEDICINE

## 2024-12-28 PROCEDURE — 77063 BREAST TOMOSYNTHESIS BI: CPT | Performed by: INTERNAL MEDICINE

## 2025-01-04 NOTE — TELEPHONE ENCOUNTER
Refill passed protocol- pt states she misplaced -asking for earlier refill.    Hypertensive Medications  Protocol Criteria:  Appointment scheduled in the past 6 months or in the next 3 months  BMP or CMP in the past 12 months  Creatinine result < 2  Recent Outpatient Visits              2 weeks ago AK (actinic keratosis)    [facility] Alicja Uribe MD    Office Visit    1 month ago Infectious colitis    [facility] Marlys Manzano MD    Office Visit    2 months ago [facility]    6 months ago Routine general medical examination at a health care facility    [facility] Karina Muñoz APRN    Office Visit    1 year ago Tooth infection    [facility]    Office Visit          Future Appointments         Provider Department Appt Notes    In 2 months Jonny Flores DO [facility] - OB[facility]N     In 6 months Tita Paul MD [facility] px last one 7/5/24          Lab Results   Component Value Date     (H) 10/31/2024    BUN 21 10/31/2024    CREATSERUM 0.92 10/31/2024    BUNCREA 22.8 (H) 10/31/2024    GFRNAA 69 06/10/2022    GFRAA 81 06/10/2022    CA 9.8 10/31/2024    AST 26 10/31/2024    ALT 34 10/31/2024    BILT 0.8 10/31/2024    TP 7.7 10/31/2024    ALB 4.5 10/31/2024     10/31/2024    K 4.0 10/31/2024     10/31/2024    CO2 30.0 10/31/2024    GLOBULIN 3.2 10/31/2024    AGRATIO 1.5 06/10/2022    ANIONGAP 6 10/31/2024    OSMOCALC 297 (H) 10/31/2024

## 2025-01-06 RX ORDER — METOPROLOL TARTRATE 50 MG
50 TABLET ORAL 2 TIMES DAILY
Qty: 180 TABLET | Refills: 1 | Status: SHIPPED | OUTPATIENT
Start: 2025-01-06

## 2025-02-17 ENCOUNTER — TELEPHONE (OUTPATIENT)
Dept: DERMATOLOGY CLINIC | Facility: CLINIC | Age: 65
End: 2025-02-17

## 2025-02-17 NOTE — TELEPHONE ENCOUNTER
Pt has used topical for 6 weeks, she feels the area has improved but never turned red or irritated.  She would like to know if she should continue for another round or stop.  Advised patient to send a photo via [employer] for determination. Appt offered but patient rather send a photo.

## 2025-03-05 ENCOUNTER — OFFICE VISIT (OUTPATIENT)
Dept: OBGYN CLINIC | Facility: CLINIC | Age: 65
End: 2025-03-05
Payer: COMMERCIAL

## 2025-03-05 VITALS
DIASTOLIC BLOOD PRESSURE: 92 MMHG | SYSTOLIC BLOOD PRESSURE: 143 MMHG | BODY MASS INDEX: 37 KG/M2 | WEIGHT: 238.38 LBS | HEART RATE: 60 BPM

## 2025-03-05 DIAGNOSIS — N95.0 POSTMENOPAUSAL BLEEDING: Primary | ICD-10-CM

## 2025-03-05 PROCEDURE — 88325 CONSLTJ COMPRE RVW REC REPRT: CPT | Performed by: OBSTETRICS & GYNECOLOGY

## 2025-03-05 PROCEDURE — 88342 IMHCHEM/IMCYTCHM 1ST ANTB: CPT

## 2025-03-05 PROCEDURE — 88341 IMHCHEM/IMCYTCHM EA ADD ANTB: CPT

## 2025-03-05 PROCEDURE — 58100 BIOPSY OF UTERUS LINING: CPT | Performed by: OBSTETRICS & GYNECOLOGY

## 2025-03-05 NOTE — PROGRESS NOTES
Subjective:   Patient ID: Dora Pressley is a 64 year old female.    HPI    History of Present Illness  Dora Pressley is a 64 year old female who presents with postmenopausal bleeding.    She experiences intermittent postmenopausal bleeding over the past year, occurring approximately three days each month. The bleeding is typically red in color, although it may appear brown on her pad. She does not notice active bleeding when wiping and describes it as not 'gushing'.    In addition to bleeding, she reports a yellowish discharge. She has a history of a benign polyp removal in 2020 but has not undergone any recent ultrasounds or further evaluations for her current symptoms.    She is concerned about the financial implications of potential diagnostic procedures due to her income level and lack of Medicaid coverage, as she earns approximately $32,000 annually.        History/Other:   Review of Systems  Current Outpatient Medications   Medication Sig Dispense Refill    metoprolol tartrate 50 MG Oral Tab Take 1 tablet (50 mg total) by mouth 2 (two) times daily. 180 tablet 1    Imiquimod 5 % External Cream Apply topically 2 times every week to nose 12 each 1    lisinopril-hydroCHLOROthiazide 20-25 MG Oral Tab Take 1 tablet by mouth daily. 90 tablet 3    atorvastatin 10 MG Oral Tab Take 1 tablet (10 mg total) by mouth nightly. 90 tablet 3    levocetirizine 5 MG Oral Tab Take 1 tablet (5 mg total) by mouth daily as needed for Allergies. 90 tablet 3    BABY ASPIRIN OR Take 1 tablet by mouth daily.      albuterol 108 (90 Base) MCG/ACT Inhalation Aero Soln Inhale 2 puffs into the lungs every 6 (six) hours as needed for Wheezing or Shortness of Breath. 8.5 g 0     Allergies:Allergies[1]    Objective:   Physical Exam    Assessment & Plan:   1. Encounter for gynecological examination without abnormal finding    2. Screening mammogram for breast cancer        No orders of the defined types were placed in this encounter.      Meds  This Visit:  Requested Prescriptions      No prescriptions requested or ordered in this encounter       Imaging & Referrals:  None    Assessment & Plan  Postmenopausal Bleeding  Intermittent vaginal bleeding over the past year, approximately three days per month. Bleeding starts red and turns brown. Postmenopausal bleeding is abnormal. Previous benign evaluations include polyp removal in 2020. No recent ultrasounds. Financial concerns regarding insurance coverage for potential procedures were discussed. Informed consent obtained, discussing the need to rule out abnormal growths, potential further procedures, and financial implications.  Perform endometrial sampling to rule out abnormal growth.   - Avoid ultrasound until pathology results are available due to insurance constraints.           [1] No Known Allergies

## 2025-03-05 NOTE — PROGRESS NOTES
Endometrial Biopsy    Pre-Procedure Care:   Consent was obtained.  Procedure/risks were explained.  Questions were answered.  Correct patient was identified.  Correct side and site were confirmed.    Pregnancy Results: n/a   Birth control method(s) used:     Pre-Medications:    The patient was not premedicated     Description of Procedure:  Under satisfactory analgesia, the patient was prepped and draped in the dorsal lithotomy position.   A bivalve speculum was placed in the vagina and the cervix was prepped with Betadine solution.   Single tooth tenaculum placed at the 12 o'clock position.   The uterine cavity was sounded at 8 cm.   The endometrial cavity was curetted for pipelle tissue sampling, 1 passes.  Specimen was sent to pathology.   The single tooth tenaculum was removed.   Silver nitrate was applied at the site of tenaculum application   Good hemostasis was noted.  There were no complications.    There was no blood loss.      Discharge instructions were provided to the patient.    Visit Plan:  Await final pathology prior to treatment.

## 2025-03-06 PROCEDURE — 88325 CONSLTJ COMPRE RVW REC REPRT: CPT | Performed by: OBSTETRICS & GYNECOLOGY

## 2025-03-07 NOTE — TELEPHONE ENCOUNTER
Ok to do another course of imiquimod. It appears somewhat improved.  I would like to recheck the area after that- it may take 2-3 weeks for any redness to subside after finishing course.

## 2025-03-14 ENCOUNTER — TELEPHONE (OUTPATIENT)
Dept: OBGYN CLINIC | Facility: CLINIC | Age: 65
End: 2025-03-14

## 2025-03-14 NOTE — TELEPHONE ENCOUNTER
Message to Dr. Flores as FYI.  Patient is concerned about results being sent out to a separate lab.

## 2025-03-14 NOTE — TELEPHONE ENCOUNTER
Patient informed that the results are not back yet.  Patient informed RN will check with the lab to see if we can get a timeframe when results will be back.    Spoke to pathology.  This specimen was sent out to the [facility] on 3/10.  Pathology states results can take anywhere from 7-25 days.  She states the absolute earliest we may get results would be 3/22.  [employer] message sent to patient with information.

## 2025-03-24 DIAGNOSIS — I10 HTN (HYPERTENSION), BENIGN: ICD-10-CM

## 2025-03-28 RX ORDER — LISINOPRIL AND HYDROCHLOROTHIAZIDE 20; 25 MG/1; MG/1
1 TABLET ORAL DAILY
Qty: 90 TABLET | Refills: 3 | OUTPATIENT
Start: 2025-03-28

## 2025-03-28 NOTE — TELEPHONE ENCOUNTER
Outpatient Medication Detail     Disp Refills Start End    lisinopril-hydroCHLOROthiazide 20-25 MG Oral Tab 90 tablet 3 9/24/2024 --    Sig - Route: Take 1 tablet by mouth daily. - Oral    Sent to pharmacy as: Lisinopril-hydroCHLOROthiazide 20-25 MG Oral Tablet (Zestoretic)    E-Prescribing Status: Receipt confirmed by pharmacy (9/24/2024  1:59 PM CDT)      Associated Diagnoses    HTN (hypertension), benign        Pharmacy    [facility] [address] [place] [address] [phone], [phone]

## 2025-04-07 ENCOUNTER — TELEPHONE (OUTPATIENT)
Dept: OBGYN CLINIC | Facility: CLINIC | Age: 65
End: 2025-04-07

## 2025-04-07 NOTE — TELEPHONE ENCOUNTER
Patient states she has been waiting to discuss test results, calling for the next steps. Please advise

## 2025-04-15 ENCOUNTER — TELEPHONE (OUTPATIENT)
Dept: INTERNAL MEDICINE CLINIC | Facility: CLINIC | Age: 65
End: 2025-04-15

## 2025-04-18 ENCOUNTER — NURSE ONLY (OUTPATIENT)
Facility: CLINIC | Age: 65
End: 2025-04-18

## 2025-04-18 DIAGNOSIS — Z12.11 COLON CANCER SCREENING: Primary | ICD-10-CM

## 2025-04-18 NOTE — PROGRESS NOTES
GI Staff:  TCS Colon Screening Orders    Please schedule: Colonoscopy 24424 with MAC   Please send split dose Golytely bowel prep     Diagnosis: Colon Screening Z12.11   Medication adjustments: ASA hold x 4 days?  Day before procedure, hold:  Day of procedure, hold:     >>>Please inform patient if new medications are started after scheduling procedure they need to call clinic to notify us.     RN called pt, recommended clinic appt r/t intermittent rectal bleeding and irregular bowels. Pt takes ASA for superficial left leg thrombus and takes a daily baby ASA for this. Pt refused to schedule a clinic visit. Is aware that she would need to hold ASA for 4 days before procedure; instructed her to ask the prescribing MD if it is ok to hold for 4 days. If they do not give approval to hold, she needs to call the GI office. Pt aware that procedure may be canceled on procedure day if anesthesia does not feel it is safe to proceed. Pt preferred to proceed with direct schedule only.     Called patient for TCS.  Please advise on colonoscopy and bowel prep orders.   Medications, pharmacy, and allergies reviewed.     Age 45-76 y/o: Yes  › MD preference: N/A  › Insurance:  [employer]  › Last PCP visit: 10/31/24  › Last CBC:   › Date of positive FIT (if applicable): No  › H/W/BMI: 5'7\"/236lbs/37.02    Special comments/notes:    Telephone Colon Screening Questionnaire Yes No   Are you currently experiencing any new/abnormal GI symptoms? [x] []   If yes, explain:     Rectal bleeding? [x] []   Black stool? [] [x]   Dysphagia or food \"feeling stuck\" when eating? occasionally [x] []   Intractable vomiting? [] [x]   Unexplained weight loss? [] [x]   First colonoscopy? [] [x]   Family history of colon cancer? [] [x]   Any issues with anesthesia? [] [x]   If yes, explain:      Any recent complaints related to chest pain &/or shortness of breath?  [] [x]   Were you referred to a cardiologist?  [] [x]   If yes, explain:      History of   respiratory issues/oxygen/URIAH/COPD: maybe URIAH [x] []   CPAP/BiPAP:     History of devices (pacemaker/defibrillator) [] [x]   History of heart attack &/or stroke?  [] [x]   If yes, in the last 12 months? Stent placement?  [] [x]     Medications Yes  No   Anticoagulants (except Aspirin):  [] [x]   Diabetic Medication [] [x]   Weight loss meds (phentermine/vyvanse/saxsenda/etc): [] [x]   Iron/herbal/multivitamin supplement: [] [x]   Usage of marijuana, CBD &/or vape products: gummies [x] []       Color consistent with ethnicity/race, warm, dry intact, resilient.

## 2025-04-22 NOTE — PROGRESS NOTES
1st Attempt     Left Voicemail to call office back    Please transfer call to GI surgery scheduling

## 2025-05-12 NOTE — PROGRESS NOTES
I spoke with the patient and she said would like a call back next week. She is seeing her doctor tomorrow to check if it's okay to proceed due to being on blood thinners.    If patient call back please forward to GI schedulers.    Thank you!

## 2025-05-13 ENCOUNTER — TELEMEDICINE (OUTPATIENT)
Dept: INTERNAL MEDICINE CLINIC | Facility: CLINIC | Age: 65
End: 2025-05-13
Payer: COMMERCIAL

## 2025-05-13 DIAGNOSIS — E11.9 TYPE 2 DIABETES MELLITUS WITHOUT COMPLICATION, WITHOUT LONG-TERM CURRENT USE OF INSULIN (HCC): ICD-10-CM

## 2025-05-13 DIAGNOSIS — N95.0 POSTMENOPAUSAL BLEEDING: ICD-10-CM

## 2025-05-13 DIAGNOSIS — N85.00 ENDOMETRIAL HYPERPLASIA: Primary | ICD-10-CM

## 2025-05-13 PROCEDURE — 98005 SYNCH AUDIO-VIDEO EST LOW 20: CPT | Performed by: INTERNAL MEDICINE

## 2025-05-13 NOTE — PROGRESS NOTES
Patient ID: Dora Pressley is a 65 year old female.  No chief complaint on file.         HISTORY OF PRESENT ILLNESS:   Patient presents for above.  This visit is conducted using Telemedicine with live, interactive video and audio.  C/c follow up  C/o yest had a symp like ovulation left side   Also concerned about her colon - wanting to do colonoscopy -need to be off of aspirin for her colonoscopy is noted that she has been on this since 2022 for a superficial venous thrombosis  Awaiting results to see if she needs a hysterectomy   Still needs needs to see the eye dr   Have more time because she is only working 1 job for the next 3 months-summer    Review of Systems   Ten point review of systems otherwise negative with the exception of HPI and assessment and plan.    MEDICAL HISTORY:   Past Medical History[1]    Past Surgical History[2]    Medications - Current[3]    Allergies:Allergies[4]    Social History     Socioeconomic History    Marital status:      Spouse name: Not on file    Number of children: Not on file    Years of education: Not on file    Highest education level: Not on file   Occupational History    Not on file   Tobacco Use    Smoking status: Never    Smokeless tobacco: Never    Tobacco comments:     2nd hand smoke as a child of smoker   Vaping Use    Vaping status: Never Used   Substance and Sexual Activity    Alcohol use: Yes     Alcohol/week: 1.0 standard drink of alcohol     Types: 1 Glasses of wine per week    Drug use: No    Sexual activity: Not Currently   Other Topics Concern    Grew up on a farm Not Asked    History of tanning Yes    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    Breast feeding Not Asked    Reaction to local anesthetic No    Pt has a pacemaker No    Pt has a defibrillator No   Social History Narrative    Not on file     Social Drivers of Health     Food Insecurity: Not on file   Transportation Needs: Not on file   Stress: Not on file   Housing Stability: Not on file       PHYSICAL  EXAM:   Unable to perform vitals or do physical exam as this is a virtual video visit.  Patient appears alert and oriented x 3 , NAD   Patient speaking complete sentences without any conversational dyspnea or respiratory distress  No coughing heard  .    ASSESSMENT/PLAN:   1. Endometrial hyperplasia  And   2. Postmenopausal bleeding  msg sent to gyn and advied her to f/u gyn -make apt   3. Type 2 diabetes mellitus without complication, without long-term current use of insulin (HCC)  - Comp Metabolic Panel (14)  - Hemoglobin A1C  - Lipid Panel  - TSH W Reflex To Free T4  - Microalb/Creat Ratio, Random Urine  - CBC, Platelet; No Differential    Reminded pt to get labs ,   Schedule colonoscopy  Schedule f/u apt with me     No follow-ups on file.    Time spent on encounter  22 minutes   Video time 22 minutes   Documentation time 22 minutes     Dora Pressley understands video evaluation is not a substitute for face-to-face examination or emergency care. Patient advised to go to ER or call 911 for worsening symptoms or acute distress.     Telehealth outside of [facility]  Telehealth Verbal Consent   I conducted a telehealth visit with Dora Pressley today, 05/13/25, which was completed using two-way, real-time interactive audio and video communication. This has been done in good ritchie to provide continuity of care in the best interest of the provider-patient relationship, due to the COVID -19 public health crisis/national emergency where restrictions of face-to-face office visits are ongoing. Every conscious effort was taken to allow for sufficient and adequate time to complete the visit.  The patient was made aware of the limitations of the telehealth visit, including treatment limitations as no physical exam could be performed.  The patient was advised to call 911 or to go to the ER in case there was an emergency.  The patient was also advised of the potential privacy & security concerns related to the telehealth platform.    The patient was made aware of where to find [facility]'s notice of privacy practices, telehealth consent form and other related consent forms and documents.  which are located on the [facility] website. The patient verbally agreed to telehealth consent form, related consents and the risks discussed.    Lastly, the patient confirmed that they were in [place].   Included in this visit, time may have been spent reviewing labs, medications, radiology tests and decision making. Appropriate medical decision-making and tests are ordered as detailed in the plan of care above.  Coding/billing information is submitted for this visit based on complexity of care and/or time spent for the visit.    This note was prepared using Dragon Medical voice recognition dictation software. As a result errors may occur. When identified these errors have been corrected. While every attempt is made to correct errors during dictation discrepancies may still exist.    Tita Paul MD  2025         [1]   Past Medical History:   Arthritis    Check mychart...left knee    Borderline diabetes    Essential hypertension    Hyperlipidemia    Started atrovastatin...controlled    Obesity    Working on it...   [2]   Past Surgical History:  Procedure Laterality Date    Adenoidectomy      Removed with tonsils            1990, 10/1996, 1999    Scheduled induction     Other surgical history      neck surg bn cyst in the  like     Tonsillectomy   (?)    I was 7?   [3]   Current Outpatient Medications:     metoprolol tartrate 50 MG Oral Tab, Take 1 tablet (50 mg total) by mouth 2 (two) times daily., Disp: 180 tablet, Rfl: 1    Imiquimod 5 % External Cream, Apply topically 2 times every week to nose, Disp: 12 each, Rfl: 1    lisinopril-hydroCHLOROthiazide 20-25 MG Oral Tab, Take 1 tablet by mouth daily., Disp: 90 tablet, Rfl: 3    atorvastatin 10 MG Oral Tab, Take 1 tablet (10 mg total) by mouth nightly., Disp: 90 tablet, Rfl: 3     levocetirizine 5 MG Oral Tab, Take 1 tablet (5 mg total) by mouth daily as needed for Allergies., Disp: 90 tablet, Rfl: 3    BABY ASPIRIN OR, Take 1 tablet by mouth in the morning., Disp: , Rfl:     albuterol 108 (90 Base) MCG/ACT Inhalation Aero Soln, Inhale 2 puffs into the lungs every 6 (six) hours as needed for Wheezing or Shortness of Breath., Disp: 8.5 g, Rfl: 0  [4] No Known Allergies

## 2025-05-21 NOTE — PROGRESS NOTES
Patient calling back, attempted to transfer no response. Please call.   (States can only take calls after 2pm)

## 2025-05-27 NOTE — PROGRESS NOTES
Scheduled for: Colonoscopy 36509    Provider Name:  Dr Rodriguez    Date:  9/9/2025    Location:    [facility]    Sedation:  MAC    Time:  900 am (Patient made aware EM will call the day before with procedure/arrival time)    Prep:  Golytely    Meds/Allergies Reconciled?:  Physician reviewed     Diagnosis with codes:    Colon cancer screening [Z12.11]     Was patient informed to call insurance with codes (Y/N):  Yes, I confirmed [employer] insurance with the patient.    Advised Patient: Please be sure to advise our office of any insurance changes as soon as possible to avoid possible cancellation of procedure      Referral sent?:  N/A    EM or [facility] notified?:  I sent an electronic request to Endo Scheduling and received a confirmation today.      Medication Orders:  This patient verbally confirmed that she is not taking:    Iron, blood thinners, BP meds, and is not diabetic    No latex allergy, No PCN allergy and does not have a pacemaker     Misc Orders:    Hold Aspirin 4 days prior to procedure  Hold Lisinopril/Hydrochlorothiazide the night prior to the procedure and/or the day of the procedure     Further instructions given by staff:   I discussed the prep instructions with the patient which she verbally understood and is aware that I will send the instructions today via [employer].    Advised patient:    You will not be able to drive, operate machinery or make critical decisions the day of your procedure. Please make arrangements for transportation. You must have a  (age 18 or older) to accompany you, stay in the facility for the duration of your procedure and drive you home after the procedure.  You cannot use public transportation (Uber, Lyft, Taxi). The procedure involves sedation, and you will not be allowed to leave unaccompanied. Your procedure will not proceed forward if you're unable to confirm your  planned to escort you home.    Advised Patient:    [facility] requires payment of copay and any patient  responsibility at the time of registration.   The [facility] requires copay and 50% of the patient responsibility at the time of service for all Esophagogastroduodenoscopy and diagnostic Colonoscopies.     They do offer payment plans and Care Credit options if unable to pay the full amount at the time of registration.     If you have any questions regarding your potential responsibility, please contact [facility] Insurance Department at [phone] option 1.    You may receive 4 bills related to your medical procedure:   [facility] (the facility)  The procedural physician  The anesthesiologist  The pathology lab (if applicable)

## 2025-06-25 RX ORDER — ATORVASTATIN CALCIUM 10 MG/1
10 TABLET, FILM COATED ORAL NIGHTLY
Qty: 90 TABLET | Refills: 3 | Status: SHIPPED | OUTPATIENT
Start: 2025-06-25

## 2025-06-25 NOTE — TELEPHONE ENCOUNTER
Refill passed per [facility] protocols.    Requested Prescriptions   Pending Prescriptions Disp Refills    atorvastatin 10 MG Oral Tab 90 tablet 3     Sig: Take 1 tablet (10 mg total) by mouth nightly.       Cholesterol Medication Protocol Passed - 6/25/2025  9:54 AM

## 2025-07-07 ENCOUNTER — LAB ENCOUNTER (OUTPATIENT)
Dept: LAB | Age: 65
End: 2025-07-07
Attending: INTERNAL MEDICINE
Payer: COMMERCIAL

## 2025-07-07 ENCOUNTER — OFFICE VISIT (OUTPATIENT)
Dept: INTERNAL MEDICINE CLINIC | Facility: CLINIC | Age: 65
End: 2025-07-07
Payer: COMMERCIAL

## 2025-07-07 VITALS
RESPIRATION RATE: 16 BRPM | DIASTOLIC BLOOD PRESSURE: 80 MMHG | HEIGHT: 67 IN | WEIGHT: 243 LBS | OXYGEN SATURATION: 98 % | HEART RATE: 62 BPM | BODY MASS INDEX: 38.14 KG/M2 | SYSTOLIC BLOOD PRESSURE: 130 MMHG

## 2025-07-07 DIAGNOSIS — Z92.89 HISTORY OF ENDOMETRIAL BIOPSY: ICD-10-CM

## 2025-07-07 DIAGNOSIS — E11.9 TYPE 2 DIABETES MELLITUS WITHOUT COMPLICATION, WITHOUT LONG-TERM CURRENT USE OF INSULIN (HCC): ICD-10-CM

## 2025-07-07 DIAGNOSIS — Z00.00 PHYSICAL EXAM, ANNUAL: Primary | ICD-10-CM

## 2025-07-07 LAB
ALBUMIN SERPL-MCNC: 4.6 G/DL (ref 3.2–4.8)
ALBUMIN/GLOB SERPL: 1.7 {RATIO} (ref 1–2)
ALP LIVER SERPL-CCNC: 114 U/L (ref 50–130)
ALT SERPL-CCNC: 27 U/L (ref 10–49)
ANION GAP SERPL CALC-SCNC: 10 MMOL/L (ref 0–18)
AST SERPL-CCNC: 21 U/L (ref ?–34)
BILIRUB SERPL-MCNC: 1.3 MG/DL (ref 0.2–1.1)
BUN BLD-MCNC: 18 MG/DL (ref 9–23)
BUN/CREAT SERPL: 20 (ref 10–20)
CALCIUM BLD-MCNC: 9.5 MG/DL (ref 8.7–10.4)
CHLORIDE SERPL-SCNC: 102 MMOL/L (ref 98–112)
CHOLEST SERPL-MCNC: 167 MG/DL (ref ?–200)
CO2 SERPL-SCNC: 30 MMOL/L (ref 21–32)
CREAT BLD-MCNC: 0.9 MG/DL (ref 0.55–1.02)
CREAT UR-SCNC: 263.5 MG/DL
DEPRECATED RDW RBC AUTO: 41.7 FL (ref 35.1–46.3)
EGFRCR SERPLBLD CKD-EPI 2021: 72 ML/MIN/1.73M2 (ref 60–?)
ERYTHROCYTE [DISTWIDTH] IN BLOOD BY AUTOMATED COUNT: 12.6 % (ref 11–15)
EST. AVERAGE GLUCOSE BLD GHB EST-MCNC: 128 MG/DL (ref 68–126)
FASTING PATIENT LIPID ANSWER: YES
FASTING STATUS PATIENT QL REPORTED: YES
GLOBULIN PLAS-MCNC: 2.7 G/DL (ref 2–3.5)
GLUCOSE BLD-MCNC: 95 MG/DL (ref 70–99)
HBA1C MFR BLD: 6.1 % (ref ?–5.7)
HCT VFR BLD AUTO: 45.1 % (ref 35–48)
HDLC SERPL-MCNC: 44 MG/DL (ref 40–59)
HGB BLD-MCNC: 15.2 G/DL (ref 12–16)
LDLC SERPL CALC-MCNC: 98 MG/DL (ref ?–100)
MCH RBC QN AUTO: 30.1 PG (ref 26–34)
MCHC RBC AUTO-ENTMCNC: 33.7 G/DL (ref 31–37)
MCV RBC AUTO: 89.3 FL (ref 80–100)
MICROALBUMIN UR-MCNC: 1.3 MG/DL
MICROALBUMIN/CREAT 24H UR-RTO: 4.9 UG/MG (ref ?–30)
NONHDLC SERPL-MCNC: 123 MG/DL (ref ?–130)
OSMOLALITY SERPL CALC.SUM OF ELEC: 296 MOSM/KG (ref 275–295)
PLATELET # BLD AUTO: 296 10(3)UL (ref 150–450)
POTASSIUM SERPL-SCNC: 3.6 MMOL/L (ref 3.5–5.1)
PROT SERPL-MCNC: 7.3 G/DL (ref 5.7–8.2)
RBC # BLD AUTO: 5.05 X10(6)UL (ref 3.8–5.3)
SODIUM SERPL-SCNC: 142 MMOL/L (ref 136–145)
TRIGL SERPL-MCNC: 143 MG/DL (ref 30–149)
TSI SER-ACNC: 2.09 UIU/ML (ref 0.55–4.78)
VLDLC SERPL CALC-MCNC: 24 MG/DL (ref 0–30)
WBC # BLD AUTO: 5.7 X10(3) UL (ref 4–11)

## 2025-07-07 PROCEDURE — 83036 HEMOGLOBIN GLYCOSYLATED A1C: CPT | Performed by: INTERNAL MEDICINE

## 2025-07-07 PROCEDURE — 84443 ASSAY THYROID STIM HORMONE: CPT | Performed by: INTERNAL MEDICINE

## 2025-07-07 PROCEDURE — 99396 PREV VISIT EST AGE 40-64: CPT | Performed by: INTERNAL MEDICINE

## 2025-07-07 PROCEDURE — 80061 LIPID PANEL: CPT | Performed by: INTERNAL MEDICINE

## 2025-07-07 PROCEDURE — 82043 UR ALBUMIN QUANTITATIVE: CPT | Performed by: INTERNAL MEDICINE

## 2025-07-07 PROCEDURE — 82570 ASSAY OF URINE CREATININE: CPT | Performed by: INTERNAL MEDICINE

## 2025-07-07 PROCEDURE — 85027 COMPLETE CBC AUTOMATED: CPT | Performed by: INTERNAL MEDICINE

## 2025-07-07 PROCEDURE — 80053 COMPREHEN METABOLIC PANEL: CPT | Performed by: INTERNAL MEDICINE

## 2025-07-07 PROCEDURE — 36415 COLL VENOUS BLD VENIPUNCTURE: CPT | Performed by: INTERNAL MEDICINE

## 2025-07-07 NOTE — PROGRESS NOTES
Dora Pressley is a 65 year old female.  Chief Complaint   Patient presents with    Physical       HPI:   Pt comes for her annual physical  C/c annual physical  C/o takes cbd half pill once in a while   Takes aspirin - she has been on this since 2022 -for a superficial venous thrombosis           HISTORY    not working      NEW PT   C/C urgent visit   C/o vaginal bleeding  X one yr -once and then reoccurred march and it was a little more and the nit stopped until one week ago --some days spots other days then when she wipes she sees red -- positive its from the vagina   Worse with constipation     cramping x one week   postmenapausal   menapause 5 yrs ago or so   Also has a BRBPR when constipated and with hard stools and staining       not working         PMH  HL  HTN  dm 2 - 6.5 on 10/2020  H/o preeclampsia   Left leg superficial thrombus 11/2022 - on daily asa      Lives with  and 3 kids - 24 son 21 daughter and 17 yo in [facility] [employer]   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 [employer]         Current Medications[1]   Past Medical History[2]   Past Surgical History[3]   Social History:  Short Social Hx on File[4]     REVIEW OF SYSTEMS:   GENERAL HEALTH: No fevers, chills, sweats, fatigue  VISION: No recent vision problems, blurry vision or double vision  HEENT: No decreased hearing ear pain nasal congestion or sore throat  SKIN: denies any unusual skin lesions or rashes  RESPIRATORY: denies shortness of breath, cough, wheezing  CARDIOVASCULAR: denies chest pain on exertion, palpitations, swelling in feet  GI: denies abdominal pain and denies heartburn, nausea or vomiting  : No Pain on urination, change in the color of urine, discharge, urinating frequently  MUS: No back pain,+ joint pain- just the knee ,no muscle pain  NEURO: denies headaches , anxiety, depression    EXAM:   /80   Pulse 62   Resp 16   Ht 5' 7\" (1.702 m)   Wt 243 lb (110.2 kg)   SpO2 98%   BMI 38.06 kg/m²   GENERAL: well  developed, well nourished,in no apparent distress  SKIN: no rashes,no suspicious lesions  HEENT: atraumatic, normocephalic,ears and throat are clear, no frontal or maxillary sinus tenderness, pupils equal reactive to light bilaterally, extraocular muscles intact  NECK: supple,no adenopathy, nontender   LUNGS: clear to auscultation, no wheeze  CARDIO: RRR without murmur  GI: good BS's,no masses or tenderness  EXTREMITIES: no cyanosis, or edema  Bilateral barefoot skin diabetic exam is normal, visualized feet and the appearance is normal.  Bilateral monofilament/sensation of both feet is normal.  Pulsation pedal pulse exam of both lower legs/feet is normal as well.       ASSESSMENT AND PLAN:   Diagnoses and all orders for this visit:    Physical exam, annual  Patient to follow a healthy diet-watching what she eats and exercising, seatbelt use no texting and driving, sunscreens advised    History of endometrial biopsy  -     OBG Referral - In Network  Patient to follow-up with Dr. Puri    Type 2 diabetes mellitus without complication, without long-term current use of insulin ([facility])  -     Cancel: Ophthalmology Referral - In Network  -     Ophthalmology Referral - In Network    Hba1c 6.5 on 10/2020 and 6.1 on 6/2023 and 6.4 on 10/2024   [facility] [facility]  10/2024   Eye exam will refer   On Asa ,on  acei, statin   Foot 7/7/2025  Diet -- advised to follow a low carb, low sugar diet , try to be consistent                Exercise 30 min a day        Preventative medicine   Mammogram 1/2024   Pap - 8/2023 dr manley   Colonoscopy -refer   Labs 6/20237/2024     The patient indicates understanding of these issues and agrees to the plan.  No follow-ups on file.         [1]   Current Outpatient Medications   Medication Sig Dispense Refill    atorvastatin 10 MG Oral Tab Take 1 tablet (10 mg total) by mouth nightly. 90 tablet 3    metoprolol tartrate 50 MG Oral Tab Take 1 tablet (50 mg total) by mouth 2 (two) times daily. 180 tablet 1     Imiquimod 5 % External Cream Apply topically 2 times every week to nose 12 each 1    lisinopril-hydroCHLOROthiazide 20-25 MG Oral Tab Take 1 tablet by mouth daily. 90 tablet 3    levocetirizine 5 MG Oral Tab Take 1 tablet (5 mg total) by mouth daily as needed for Allergies. 90 tablet 3    BABY ASPIRIN OR Take 1 tablet by mouth in the morning.      albuterol 108 (90 Base) MCG/ACT Inhalation Aero Soln Inhale 2 puffs into the lungs every 6 (six) hours as needed for Wheezing or Shortness of Breath. 8.5 g 0   [2]   Past Medical History:   Arthritis    Check mychart...left knee    Borderline diabetes    Essential hypertension    Hyperlipidemia    Started atrovastatin...controlled    Obesity    Working on it...   [3]   Past Surgical History:  Procedure Laterality Date    Adenoidectomy      Removed with tonsils            1990, 10/1996, 1999    Scheduled induction     Other surgical history      neck surg bn cyst in the  like     Tonsillectomy   (?)    I was 7?   [4]   Social History  Socioeconomic History    Marital status:    Tobacco Use    Smoking status: Never    Smokeless tobacco: Never    Tobacco comments:     2nd hand smoke as a child of smoker   Vaping Use    Vaping status: Never Used   Substance and Sexual Activity    Alcohol use: Yes     Alcohol/week: 1.0 standard drink of alcohol     Types: 1 Glasses of wine per week    Drug use: No    Sexual activity: Not Currently   Other Topics Concern    History of tanning Yes    Reaction to local anesthetic No    Pt has a pacemaker No    Pt has a defibrillator No     Social Drivers of Health     Food Insecurity: No Food Insecurity (2025)    NCSS - Food Insecurity     Worried About Running Out of Food in the Last Year: No     Ran Out of Food in the Last Year: No   Transportation Needs: No Transportation Needs (2025)    NCSS - Transportation     Lack of Transportation: No   Housing Stability: Not At Risk (2025)    NCSS -  Housing/Utilities     Has Housing: Yes     Worried About Losing Housing: No     Unable to Get Utilities: No

## 2025-08-04 ENCOUNTER — OFFICE VISIT (OUTPATIENT)
Dept: DERMATOLOGY CLINIC | Facility: CLINIC | Age: 65
End: 2025-08-04

## 2025-08-04 DIAGNOSIS — L81.4 LENTIGO: Primary | ICD-10-CM

## 2025-08-04 DIAGNOSIS — L56.5 DSAP (DISSEMINATED SUPERFICIAL ACTINIC POROKERATOSIS): ICD-10-CM

## 2025-08-04 DIAGNOSIS — D22.9 MULTIPLE NEVI: ICD-10-CM

## 2025-08-04 DIAGNOSIS — D23.9 BENIGN NEOPLASM OF SKIN, UNSPECIFIED LOCATION: ICD-10-CM

## 2025-08-04 DIAGNOSIS — L57.0 AK (ACTINIC KERATOSIS): ICD-10-CM

## 2025-08-04 DIAGNOSIS — L82.1 SK (SEBORRHEIC KERATOSIS): ICD-10-CM

## 2025-08-04 PROCEDURE — 99213 OFFICE O/P EST LOW 20 MIN: CPT | Performed by: DERMATOLOGY

## 2025-08-30 ENCOUNTER — TELEPHONE (OUTPATIENT)
Dept: OBGYN CLINIC | Facility: CLINIC | Age: 65
End: 2025-08-30

## (undated) NOTE — LETTER
AUTHORIZATION FOR SURGICAL OPERATION OR OTHER PROCEDURE    1. I hereby authorize Dr. Linnette Lerma, and [facility] [facility] staff assigned to my case to perform the following operation and/or procedure at the [facility], [facility]:    _______________________________________________________________________________________________    ENDOMETRIAL BIOPSY  _______________________________________________________________________________________________    2. My physician has explained the nature and purpose of the operation or other procedure, possible alternative methods of treatment, the risks involved, and the possibility of complication to me. I acknowledge that no guarantee has been made as to the result that may be obtained. 3.  I recognize that, during the course of this operation, or other procedure, unforseen conditions may necessitate additional or different procedure than those listed above. I, therefore, further authorize and request that the above named physician, his/her physician assistants or designees perform such procedures as are, in his/her professional opinion, necessary and desirable. 4.  Any tissue or organs removed in the operation or other procedure may be disposed of by and at the discretion of the [facility], [facility] and [facility]. 5.  I understand that in the event of a medical emergency, I will be transported by local paramedics to [facility] or other [facility] emergency department. 6.  I certify that I have read and fully understand the above consent to operation and/or other procedure. 7.  I acknowledge that my physician has explained sedation/analgesia administration to me including the risks and benefits. I consent to the administration of sedation/analgesia as may be necessary or desirable in the judgement of my physician. Witness signature: ___________________________________________________ Date:  _8___/_16___/_23__                    Time:  _5:40___ A. M. P.M.       Patient Name:  ______________________________________________________  (please print)      Patient signature:  ___________________________________________________             Relationship to Patient:           []  Parent    Responsible person                          []  Spouse  In case of minor or                    [] Other  _____________   Incompetent name:  __________________________________________________                               (please print)      _____________      Responsible person  In case of minor or  Incompetent signature:  _______________________________________________    Statement of Physician  My signature below affirms that prior to the time of the procedure, I have explained to the patient and/or his/her guardian, the risks and benefits involved in the proposed treatment and any reasonable alternative to the proposed treatment. I have also explained the risks and benefits involved in the refusal of the proposed treatment and have answered the patient's questions.                         Date:  ______/______/_______  Provider                      Signature:  __________________________________________________________       Time:  ___________ A.M    P.M.

## (undated) NOTE — LETTER
AUTHORIZATION FOR SURGICAL OPERATION OR OTHER PROCEDURE    1. I hereby authorize Dr. Flores, and [facility] staff assigned to my case to perform the following operation and/or procedure at the [facility] site:    _______________________________________________________________________________________________    Endometrial Biopsy  _______________________________________________________________________________________________    2.  My physician has explained the nature and purpose of the operation or other procedure, possible alternative methods of treatment, the risks involved, and the possibility of complication to me.  I acknowledge that no guarantee has been made as to the result that may be obtained.  3.  I recognize that, during the course of this operation, or other procedure, unforseen conditions may necessitate additional or different procedure than those listed above.  I, therefore, further authorize and request that the above named physician, his/her physician assistants or designees perform such procedures as are, in his/her professional opinion, necessary and desirable.  4.  Any tissue or organs removed in the operation or other procedure may be disposed of by and at the discretion of the [facility] and [facility].  5.  I understand that in the event of a medical emergency, I will be transported by local paramedics to [facility] or other hospital emergency department.  6.  I certify that I have read and fully understand the above consent to operation and/or other procedure.    7.  I acknowledge that my physician has explained sedation/analgesia administration to me including the risks and benefits.  I consent to the administration of sedation/analgesia as may be necessary or desirable in the judgement of my physician.    Witness signature: ___________________________________________________ Date:  ______/______/_____                     Time:  ________ A.M.  P.M.       Patient Name:  ______________________________________________________  (please print)      Patient signature:  ___________________________________________________             Relationship to Patient:           []  Parent    Responsible person                          []  Spouse  In case of minor or                    [] Other  _____________   Incompetent name:  __________________________________________________                               (please print)      _____________      Responsible person  In case of minor or  Incompetent signature:  _______________________________________________    Statement of Physician  My signature below affirms that prior to the time of the procedure, I have explained to the patient and/or his/her guardian, the risks and benefits involved in the proposed treatment and any reasonable alternative to the proposed treatment.  I have also explained the risks and benefits involved in the refusal of the proposed treatment and have answered the patient's questions.                        Date:  ______/______/_______  Provider                      Signature:  __________________________________________________________       Time:  ___________ A.M    P.M.

## (undated) NOTE — LETTER
12/4/2020          To Whom It May Concern: Chato Clayton is currently under my medical care and may not return to work at this time. Please excuse [facility] for 2 weeks. She may return to work on 12/14/2020. Activity is restricted as follows: none.     I

## (undated) NOTE — LETTER
AUTHORIZATION FOR SURGICAL OPERATION OR OTHER PROCEDURE    1.  I hereby authorize Lyle Lund and [facility] staff assigned to my case to perform the following operation and/or procedure at the [address]    2.  My physicia Relationship to Patient:           []  Parent    Responsible person                          []  Spouse  In case of minor or                    [] Other  _____________   Incompetent name:  __________________________________________________

## (undated) NOTE — LETTER
12/4/2024              Dora Pressley        [address] [address]         To whom it may concern,     Dora was seen in my office on 12/04/2024 for diarrhea. Please excuse her from work starting 12/02/2024. She can go back to work when diarrhea has resolved.  Sincerely,    Marlys Manzano MD          Document electronically generated by:  Marlys Manzano MD

## (undated) NOTE — LETTER
11/27/2020              [address]         Dear Tye Obrien records indicate that the tests ordered for you by Deepa Regan MD  have not been done.   If you have, in fact, already completed the tests